# Patient Record
Sex: MALE | Race: WHITE | NOT HISPANIC OR LATINO | Employment: OTHER | URBAN - METROPOLITAN AREA
[De-identification: names, ages, dates, MRNs, and addresses within clinical notes are randomized per-mention and may not be internally consistent; named-entity substitution may affect disease eponyms.]

---

## 2020-11-02 RX ORDER — MELOXICAM 15 MG/1
15 TABLET ORAL DAILY
COMMUNITY
End: 2020-12-03

## 2020-11-02 RX ORDER — HYDROCODONE BITARTRATE AND ACETAMINOPHEN 7.5; 325 MG/1; MG/1
1 TABLET ORAL EVERY 6 HOURS PRN
COMMUNITY
End: 2022-06-24

## 2020-11-02 RX ORDER — DIPHENHYDRAMINE HCL 50 MG
50 CAPSULE ORAL
COMMUNITY
End: 2020-12-03

## 2020-11-02 RX ORDER — METOPROLOL SUCCINATE 25 MG/1
12.5 TABLET, EXTENDED RELEASE ORAL DAILY
COMMUNITY
Start: 2020-10-24 | End: 2020-12-03 | Stop reason: SDUPTHER

## 2020-11-03 ENCOUNTER — OFFICE VISIT (OUTPATIENT)
Dept: CARDIOLOGY | Facility: CLINIC | Age: 55
End: 2020-11-03

## 2020-11-03 VITALS
HEART RATE: 83 BPM | OXYGEN SATURATION: 99 % | WEIGHT: 274.6 LBS | SYSTOLIC BLOOD PRESSURE: 108 MMHG | DIASTOLIC BLOOD PRESSURE: 68 MMHG | HEIGHT: 73 IN | BODY MASS INDEX: 36.39 KG/M2

## 2020-11-03 DIAGNOSIS — I77.810 DILATED AORTIC ROOT (HCC): ICD-10-CM

## 2020-11-03 DIAGNOSIS — I51.9 RIGHT VENTRICULAR DYSFUNCTION: ICD-10-CM

## 2020-11-03 DIAGNOSIS — Z82.49 FAMILY HISTORY OF CORONARY ARTERY DISEASE: ICD-10-CM

## 2020-11-03 DIAGNOSIS — R07.2 PRECORDIAL PAIN: ICD-10-CM

## 2020-11-03 DIAGNOSIS — G47.30 SLEEP APNEA, UNSPECIFIED TYPE: ICD-10-CM

## 2020-11-03 DIAGNOSIS — I48.0 PAROXYSMAL ATRIAL FIBRILLATION (HCC): Primary | ICD-10-CM

## 2020-11-03 PROCEDURE — 99205 OFFICE O/P NEW HI 60 MIN: CPT | Performed by: INTERNAL MEDICINE

## 2020-11-04 NOTE — PROGRESS NOTES
Date of Office Visit: 2020  Encounter Provider: Carlos A Falcon MD  Place of Service: [facility] CARDIOLOGY  Patient Name: Aris Cameron  :1965    Chief complaint:  Paroxysmal atrial fibrillation, right ventricular dysfunction, dilated aortic root, family history of coronary artery disease, and recent chest pain.    History of Present Illness:    I had the pleasure of seeing the patient in cardiology office on 11/3/2020.  He is a very pleasant 55 year-old male with progressive deafness, paroxysmal atrial fibrillation, and a strong family history of coronary artery disease who presents for evaluation.  The patient is completely deaf, but communicates very effectively with reading lips and written cues.    The patient states that he developed chest discomfort in 2020 which he described as a tightness across his precordium with some radiation and paresthesia into the left arm.  This went on for approximately 1 week, and he also had been feeling his heart fluttering at times.  He presented to his primary care physician on 10/23/2020, and he was found to be in atrial fibrillation with rapid ventricular response.  This was a new diagnosis for him, and he subsequently was admitted to [facility].  He was rate controlled, and eventually converted back to sinus rhythm.  He was ultimately discharged on metoprolol succinate, and he was initiated on anticoagulation with Eliquis.  While hospitalized, he did have an echocardiogram on 10/23/2020 which showed a normal ejection fraction of 58%, although his right ventricle was noted to be enlarged and severely reduced in function.  He also had a mildly dilated aortic root.    Since discharge, he does feel better.  He has had very little chest discomfort, and he states he feels better in rhythm.  However, he still feels the atrial fibrillation intermittently which will last for 15 to 20 minutes at a time.  His  heart rate will typically go up, and he can feel the palpitations which she describes as a fluttering sensation.  The metoprolol has helped, although his blood pressure has been lower at times, and is 108/68 today.  He has not had any lightheadedness or near syncope.  He did tell me that he does snore at night, and he certainly could have sleep apnea, although he has never been checked.  He was drinking up to 24 cans of beer per week prior to being diagnosed, although he has not had any alcohol since the atrial fibrillation was diagnosed.    Of note, he does have a very significant family history of coronary artery disease.  His mother is  from a fatal MI at age 59, and his father had an MI at age 62 in the setting of prostate cancer.  His brother also had a fatal MI at age 57, although he had end-stage renal disease on dialysis as well.    Past Medical History:   Diagnosis Date   • Anxiety    • Deaf    • GERD (gastroesophageal reflux disease)    • HTN (hypertension)    • Insomnia    • Obesity    • PAF (paroxysmal atrial fibrillation) (CMS/[facility])        Past Surgical History:   Procedure Laterality Date   • HAND SURGERY     • INNER EAR SURGERY         Current Outpatient Medications on File Prior to Visit   Medication Sig Dispense Refill   • diphenhydrAMINE (BENADRYL) 50 MG capsule Take 50 mg by mouth.     • HYDROcodone-acetaminophen (NORCO) 7.5-325 MG per tablet Take 1 tablet by mouth.     • meloxicam (MOBIC) 15 MG tablet Take 15 mg by mouth Daily.     • metoprolol succinate XL (TOPROL-XL) 25 MG 24 hr tablet Take 12.5 mg by mouth Daily.       No current facility-administered medications on file prior to visit.      Allergies as of 2020   • (No Known Allergies)     Social History     Socioeconomic History   • Marital status: Single     Spouse name: Not on file   • Number of children: Not on file   • Years of education: Not on file   • Highest education level: Not on file   Tobacco Use   • Smoking status:  "Never Smoker   • Smokeless tobacco: Never Used   Substance and Sexual Activity   • Alcohol use: Yes     Alcohol/week: 24.0 standard drinks     Types: 24 Cans of beer per week     Binge frequency: Weekly   • Drug use: Never   • Sexual activity: Yes     Family History   Problem Relation Age of Onset   • Coronary artery disease Mother         Mother with fatal MI at age 59   • Coronary artery disease Father         Father with MI at age 62   • Prostate cancer Father 59        malignant tumor of prostate  62   • Heart disease Brother 57        Brother with fatal MI at age 57       Review of Systems   HENT: Positive for hearing loss.    Cardiovascular: Positive for chest pain and palpitations.   Genitourinary: Positive for frequency.   Neurological: Positive for numbness.   All other systems reviewed and are negative.     Objective:     Vitals:    20 1014   BP: 108/68   Pulse: 83   SpO2: 99%   Weight: 125 kg (274 lb 9.6 oz)   Height: 185.4 cm (73\")     Body mass index is 36.23 kg/m².    Constitutional:       Appearance: Healthy appearance. Well-developed.   Eyes:      Conjunctiva/sclera: Conjunctivae normal.   HENT:      Head: Normocephalic and atraumatic.         Comments: Deaf  Neck:      Musculoskeletal: Neck supple.   Pulmonary:      Effort: Pulmonary effort is normal.      Breath sounds: Normal breath sounds.   Cardiovascular:      Normal rate. Regular rhythm.      Murmurs: There is no murmur.      No gallop.   Edema:     Peripheral edema absent.   Abdominal:      Palpations: Abdomen is soft.      Tenderness: There is no abdominal tenderness.   Skin:     General: Skin is warm.   Neurological:      Mental Status: Alert and oriented to person, place, and time.   Psychiatric:         Behavior: Behavior normal.       Lab Review:   Procedures    Cardiac Procedures:  1.  Echocardiogram on 10/23/2020 [facility]: The ejection fraction was 58%.  There was mild LVH.  The right ventricle was enlarged and " severely reduced in function.  The left atrium was mildly enlarged.  The aortic root was mildly dilated.    Assessment:       Diagnosis Plan   1. Paroxysmal atrial fibrillation (CMS/HCC)  Home Sleep Study    CT Angiogram Chest With & Without Contrast   2. Precordial pain  CT Angiogram Chest With & Without Contrast   3. Right ventricular dysfunction  CT Angiogram Chest With & Without Contrast   4. Dilated aortic root (CMS/HCC)  CT Angiogram Chest With & Without Contrast   5. Family history of coronary artery disease     6. Sleep apnea, unspecified type  Home Sleep Study     Plan:       I had a long discussion with the patient, again with lipreading and I wrote down several things for him as well.  On reviewing his echocardiogram, he had significant right ventricular dysfunction in the setting of chest discomfort and newly diagnosed atrial fibrillation.  I would be most concerned about a pulmonary embolism in this setting, and I certainly feel this needs to be evaluated even with him on anticoagulation.  I am going to check a CT angiogram of his chest to evaluate for any potential pulmonary embolism at this time.  His aortic root was also dilated on the echocardiogram, and the CT angiogram will also further evaluate his aorta in detail.    I agree with the Eliquis for anticoagulation.  He is not having any issues with taking this.  I did educate him on alcohol and the correlation with atrial fibrillation.  I told him to limit drinking to less than 2 standard drinks, and preferably to abstain if possible.  This will minimize the atrial fibrillation more, and also will minimize his risk of bleeding as alcohol is also a blood thinner.  We also discussed his sleep habits in detail, and he did agree to a sleep study.  He states that he certainly could have sleep apnea, which could be contributing to the atrial fibrillation as well.  I have ordered a home sleep study, which she agreed with.    His blood pressure has been on  the lower side, and I am going to have him take the metoprolol succinate at 12.5 mg/day.  If he has episodes of atrial fibrillation that are significant, he can take either an extra 12.5 or 25 mg.  His blood pressure today is 108/68, and I did not want to run much lower than this to cause any lightheadedness.    He does have a very significant family history of coronary artery disease as detailed above in both of his parents and his brother.  If his CT angiogram of the chest does not show significant evidence of a pulmonary embolism or other aortic pathology, I will likely proceed with a nuclear stress test afterwards.  I do feel that an ischemic evaluation is warranted in the setting with his family history and his chest discomfort and left arm paresthesias prior to his diagnosis of atrial fibrillation.    I will have him follow-up with [facility], in 1 month.  He will follow-up with me in 3 months.    I spent 1 hour in the care of the patient, including extensively reviewing his hospital records, as well as in direct patient care.  Greater than 50% of this time was spent in face-to-face counseling with the patient regarding his management of the atrial fibrillation and work-up of his chest discomfort and right ventricular dysfunction.

## 2020-11-11 ENCOUNTER — HOSPITAL ENCOUNTER (OUTPATIENT)
Dept: CT IMAGING | Facility: HOSPITAL | Age: 55
Discharge: HOME | End: 2020-11-11
Admitting: INTERNAL MEDICINE

## 2020-11-11 LAB — CREAT BLDA-MCNC: 0.9 MG/DL (ref 0.6–1.3)

## 2020-11-11 PROCEDURE — 0 IOPAMIDOL PER 1 ML: Performed by: INTERNAL MEDICINE

## 2020-11-11 PROCEDURE — 71275 CT ANGIOGRAPHY CHEST: CPT

## 2020-11-11 PROCEDURE — 82565 ASSAY OF CREATININE: CPT

## 2020-11-11 RX ADMIN — IOPAMIDOL 95 ML: 755 INJECTION, SOLUTION INTRAVENOUS at 13:51

## 2020-11-12 DIAGNOSIS — I48.0 PAF (PAROXYSMAL ATRIAL FIBRILLATION) (HCC): Primary | ICD-10-CM

## 2020-11-12 NOTE — PROGRESS NOTES
I called and gave the results.  He will need a repeat CT scan in 1 year.  He has been having rapid heart rates frequently and feels his heart going into the 150s at times.  I am going to get a 24-hour Holter monitor as soon as possible to reassess the atrial fibrillation.    GM

## 2020-11-13 ENCOUNTER — APPOINTMENT (OUTPATIENT)
Dept: CT IMAGING | Facility: HOSPITAL | Age: 55
End: 2020-11-13

## 2020-11-30 ENCOUNTER — TELEPHONE (OUTPATIENT)
Dept: CARDIOLOGY | Facility: CLINIC | Age: 55
End: 2020-11-30

## 2020-11-30 NOTE — TELEPHONE ENCOUNTER
I called an spoke with patient's wife Darcy to inform her that [facility] has switched to telehealth due to the rise in covid numbers. She stated that the patient is deaf so he cannot have telehealth visits. I scheduled them to be seen in person with ADOLPH John.

## 2020-12-03 ENCOUNTER — OFFICE VISIT (OUTPATIENT)
Dept: CARDIOLOGY | Facility: CLINIC | Age: 55
End: 2020-12-03

## 2020-12-03 DIAGNOSIS — I77.810 ASCENDING AORTA DILATION (HCC): ICD-10-CM

## 2020-12-03 DIAGNOSIS — H91.90 DEAFNESS, UNSPECIFIED LATERALITY: ICD-10-CM

## 2020-12-03 DIAGNOSIS — R07.9 CHEST PAIN, UNSPECIFIED TYPE: ICD-10-CM

## 2020-12-03 DIAGNOSIS — I48.91 ATRIAL FIBRILLATION WITH RVR (HCC): Primary | ICD-10-CM

## 2020-12-03 DIAGNOSIS — R79.89 ELEVATED LFTS: ICD-10-CM

## 2020-12-03 DIAGNOSIS — F10.10 ALCOHOL ABUSE, DAILY USE: ICD-10-CM

## 2020-12-03 DIAGNOSIS — R29.818 SUSPECTED SLEEP APNEA: ICD-10-CM

## 2020-12-03 DIAGNOSIS — E66.9 CLASS 2 OBESITY WITH BODY MASS INDEX (BMI) OF 37.0 TO 37.9 IN ADULT, UNSPECIFIED OBESITY TYPE, UNSPECIFIED WHETHER SERIOUS COMORBIDITY PRESENT: ICD-10-CM

## 2020-12-03 DIAGNOSIS — I10 ESSENTIAL HYPERTENSION: ICD-10-CM

## 2020-12-03 DIAGNOSIS — E78.5 HYPERLIPIDEMIA, UNSPECIFIED HYPERLIPIDEMIA TYPE: ICD-10-CM

## 2020-12-03 DIAGNOSIS — I77.810 AORTIC ROOT DILATION (HCC): ICD-10-CM

## 2020-12-03 PROCEDURE — 93000 ELECTROCARDIOGRAM COMPLETE: CPT | Performed by: NURSE PRACTITIONER

## 2020-12-03 PROCEDURE — 99215 OFFICE O/P EST HI 40 MIN: CPT | Performed by: NURSE PRACTITIONER

## 2020-12-03 RX ORDER — ZOLPIDEM TARTRATE 12.5 MG/1
12.5 TABLET, FILM COATED, EXTENDED RELEASE ORAL
COMMUNITY
Start: 2020-10-26

## 2020-12-03 RX ORDER — AMIODARONE HYDROCHLORIDE 200 MG/1
200 TABLET ORAL DAILY
Qty: 30 TABLET | Refills: 0 | Status: SHIPPED | OUTPATIENT
Start: 2020-12-03 | End: 2020-12-18

## 2020-12-03 RX ORDER — METOPROLOL SUCCINATE 25 MG/1
12.5 TABLET, EXTENDED RELEASE ORAL DAILY
Qty: 15 TABLET | Refills: 1 | Status: SHIPPED | OUTPATIENT
Start: 2020-12-03 | End: 2020-12-28

## 2020-12-03 NOTE — H&P (VIEW-ONLY)
Date of Office Visit: 2020  Encounter Provider: NGOC Tabares  Primary Cardiologist: Dr. Falcon  Place of Service: [facility] CARDIOLOGY  Patient Name: Aris Cameron  :1965      Subjective:     Chief Complaint:  PAF, chest pain 1 month follow up    History of Present Illness:  Aris Cameron is a pleasant 55 y.o. male who is new to me .  Outside records have been requested and reviewed by me if available.     This is a patient of Dr. Falcon with progressive deafness (does not sign but communicates with written cues and reading lips), paroxysmal atrial fibrillation, strong family history of coronary artery disease, dilated aortic root, RV dysfunction, suspected sleep apnea, daily alcohol use.    Patient developed chest tightness across the precordium with radiation paresthesia into the left arm that went on for approximately 1 week with some fluttering of his heart and saw his PCP 10/23/2020 was found to be in atrial fibrillation with rapid ventricular response which is a new diagnosis so he was admitted to [facility].  He was rate controlled and eventually converted back to sinus rhythm and was discharged on metoprolol succinate and initiated on anticoagulation with Eliquis.  He had an echo 10/23/2020 that showed normal EF 58%, right ventricle was noted to be enlarged and severely reduced in function and mildly dilated aortic root.    11/3/2020 patient presented to see Dr. Falcon.  Patient was still having some atrial fibrillation intermittently lasting 15 to 20 minutes at a time can feel palpitations.  Apparently had very little chest discomfort.  Metoprolol had helped but his blood pressure been lower at times and was 108/68 but he had not had any lightheadedness or near syncope.  He did report snoring.  Was noted he was drinking up to 24 cans of beer per week but had not had any alcohol since atrial fibrillation was diagnosed.   Mother  of a fatal MI at age 59, father had MI at age 62 in setting of prostate cancer, brother with fatal MI at age 57 but had end-stage renal disease on dialysis as well.  Patient was set up for CT angiogram of the chest to rule out PE and to his severe RV dysfunction as well as evaluate his aortic root.  Metoprolol succinate was continued at 12.5 mg/day and he could take an extra 12.5 or 25 mg for A. fib episodes.  Is to do a nuclear stress test if he did not have any PE or significant aortic pathology due to his chest pain symptoms and strong family history of CAD/risk factors.    2020 CT of the chest report pulmonary embolism aortic root was dilated measuring 4.4 cm and ascending aorta was mildly dilated at 4.1 cm    Due to some confusion with appointments patient ended up seeing NGOC Jensen at [facility]. fib clinic 2020 .It was recommended to complete 30 days of Eliquis then stop and start aspirin 81 mg daily and to continue metoprolol succinate.  I also recommended sleep study and limit alcohol take to 2 beers per day.    At today's visit patient is presenting for 1 month PAF follow-up.  Communication was done with use of iPad for lipreading purposes due to necessity of mask wearing.  Patient reports daily episodes sometimes multiple times a day of rapid heartbeat up into the 190s at times lasting around 15 minutes.  At times he can go a day or 2 without symptoms.  He typically symptomatic when his heart rate is above 120s.  He typically develops some palpitations, chest tightness that can be associated with some left arm paresthesias as well as some shortness of breath with it.  At other times it sounds like he may have some exertional chest tightness and possible shortness of breath that is sudden onset and resolves quickly with rest.  He does have trouble sleeping but is difficult to tell if he is describing nocturnal dyspnea or not.  He ran out of his Toprol-XL yesterday but he been  taking it daily prior to that.  He has also not stopped his apixaban as previously recommended by me.  Denies any bleeding issues or falls.  He does have some episodes of vertigo that are sudden onset and the room is spinning.  Outside of that he does have some mild lightheadedness with A. fib episodes.  Blood pressure is typically low with these episodes 90s/70s or low 100s.  He has had a couple of isolated blood pressure readings 130s to 140s/80s-90s but typically the readings he has showed me his blood pressures on the lower end.  He did end up going into rapid atrial fibrillation in the office but was actually asymptomatic with this.  He had quit alcohol for a while but is back to drinking 2 beers a day which he states has been his recommended limit.  He does have some neck pain issues and due to needing narcotic pain medicine was taken off his medicine for his anxiety sometimes he thinks anxiety drives his symptoms  Past Medical History:   Diagnosis Date   • Anxiety    • Deaf    • GERD (gastroesophageal reflux disease)    • HTN (hypertension)    • Insomnia    • Obesity    • PAF (paroxysmal atrial fibrillation) (CMS/[facility])      Past Surgical History:   Procedure Laterality Date   • HAND SURGERY     • INNER EAR SURGERY       Outpatient Medications Prior to Visit   Medication Sig Dispense Refill   • HYDROcodone-acetaminophen (NORCO) 7.5-325 MG per tablet Take 1 tablet by mouth.     • zolpidem CR (AMBIEN CR) 12.5 MG CR tablet Take 12.5 mg by mouth every night at bedtime.     • apixaban (ELIQUIS) 5 MG tablet tablet Take 1 tablet by mouth 2 (Two) Times a Day. 60 tablet 6   • diphenhydrAMINE (BENADRYL) 50 MG capsule Take 50 mg by mouth.     • meloxicam (MOBIC) 15 MG tablet Take 15 mg by mouth Daily.     • metoprolol succinate XL (TOPROL-XL) 25 MG 24 hr tablet Take 12.5 mg by mouth Daily.       No facility-administered medications prior to visit.        Allergies as of 12/03/2020   • (No Known Allergies)     Social  History     Socioeconomic History   • Marital status: Single     Spouse name: Not on file   • Number of children: Not on file   • Years of education: Not on file   • Highest education level: Not on file   Tobacco Use   • Smoking status: Never Smoker   • Smokeless tobacco: Never Used   • Tobacco comment: No caffeine   Substance and Sexual Activity   • Alcohol use: Yes     Alcohol/week: 24.0 standard drinks     Types: 24 Cans of beer per week     Binge frequency: Weekly   • Drug use: Never   • Sexual activity: Yes     Family History   Problem Relation Age of Onset   • Coronary artery disease Mother         Mother with fatal MI at age 59   • Coronary artery disease Father         Father with MI at age 62   • Prostate cancer Father 59        malignant tumor of prostate  62   • Heart disease Brother 57        Brother with fatal MI at age 57     Review of Systems   Constitution: Negative for chills, fever and malaise/fatigue.   HENT: Negative for ear pain, hearing loss, nosebleeds and sore throat.    Eyes: Negative for double vision, pain, vision loss in left eye and vision loss in right eye.   Cardiovascular: Positive for chest pain, dyspnea on exertion and palpitations. Negative for claudication, irregular heartbeat, leg swelling, near-syncope, orthopnea, paroxysmal nocturnal dyspnea and syncope.   Respiratory: Positive for shortness of breath. Negative for cough, snoring and wheezing.    Endocrine: Negative for cold intolerance and heat intolerance.   Hematologic/Lymphatic: Negative for bleeding problem.   Skin: Positive for itching and rash. Negative for color change and unusual hair distribution.   Musculoskeletal: Negative for joint pain and joint swelling.   Gastrointestinal: Negative for abdominal pain, diarrhea, hematochezia, melena, nausea and vomiting.   Genitourinary: Negative for decreased libido, frequency, hematuria, hesitancy and incomplete emptying.   Neurological: Positive for light-headedness,  "numbness, paresthesias and vertigo. Negative for excessive daytime sleepiness, dizziness, headaches, loss of balance and seizures.   Psychiatric/Behavioral: Negative for depression.          Objective:     Vitals:    12/03/20 1344 12/03/20 1421   BP: 130/80    BP Location: Right arm    Patient Position: Sitting    Pulse: 81 (!) 156   SpO2:  98%   Weight: 128 kg (282 lb)    Height: 185.4 cm (73\")      Body mass index is 37.21 kg/m².    PHYSICAL EXAM:  Vitals signs and nursing note reviewed.   Constitutional:       General: Not in acute distress.     Appearance: Well-developed and not in distress. Not diaphoretic.   HENT:      Head: Normocephalic and atraumatic.         Comments: Deaf  Neck:      Vascular: No carotid bruit.      Comments: Difficult to assess JVD due to body habitus  Pulmonary:      Effort: Pulmonary effort is normal. No respiratory distress.      Breath sounds: Normal breath sounds. No decreased breath sounds. No wheezing. No rhonchi.   Cardiovascular:      Tachycardia present. Irregularly irregular rhythm.      Murmurs: There is no murmur.      Comments: No pitting lower extremity edema underneath boots appreciated  Initially was in a regular rhythm with occasional ectopic beats but on examRapid irregular heartbeat  Pulses:     Radial: 2+ bilaterally.  Abdominal:      General: Bowel sounds are normal. There is no distension.      Palpations: Abdomen is soft.      Tenderness: There is no abdominal tenderness.   Skin:     General: Skin is warm and dry.      Findings: No erythema.   Neurological:      Mental Status: Alert and oriented to person, place, and time.   Psychiatric:         Attention and Perception: Attention normal.         Mood and Affect: Mood normal.         Speech: Speech normal.         Behavior: Behavior normal.         Thought Content: Thought content normal.         Judgment: Judgment normal.           ECG 12 Lead    Date/Time: 12/3/2020 2:24 PM  Performed by: Mary John" NGOC  Authorized by: Mary John APRN   Comparison: compared with previous ECG from 10/22/2020  Comparison to previous EC/3/2020 1354 EKG showed sinus arrhythmia otherwise not significantly changed from [facility] EKG  Rhythm: atrial fibrillation  Rate: tachycardic  BPM: 146  QRS axis: normal  Other findings: non-specific ST-T wave changes and T wave abnormality    Clinical impression: abnormal EKG  Comments: Rapid atrial fibrillation with some nonspecific T wave abnormalities primarily inferior leads  Indication: PAF, family history of CAD, intermittent chest pain            Most recent lab review:    Assessment:       Diagnosis Plan   1. Atrial fibrillation with RVR (CMS/HCC)  Stress Test With Pet Myocardial Perfusion (MULTI STUDY, REST AND STRESS)    ECG 12 Lead   2. Deafness, unspecified laterality  Stress Test With Pet Myocardial Perfusion (MULTI STUDY, REST AND STRESS)    ECG 12 Lead   3. Essential hypertension  Stress Test With Pet Myocardial Perfusion (MULTI STUDY, REST AND STRESS)    ECG 12 Lead   4. Class 2 obesity with body mass index (BMI) of 37.0 to 37.9 in adult, unspecified obesity type, unspecified whether serious comorbidity present  Stress Test With Pet Myocardial Perfusion (MULTI STUDY, REST AND STRESS)    ECG 12 Lead   5. Chest pain, unspecified type  Stress Test With Pet Myocardial Perfusion (MULTI STUDY, REST AND STRESS)    ECG 12 Lead   6. Aortic root dilation (CMS/HCC)     7. Ascending aorta dilation (CMS/HCC)     8. Hyperlipidemia, unspecified hyperlipidemia type     9. Alcohol abuse, daily use     10. Suspected sleep apnea     11. Elevated LFTs         Plan:     1.  Paroxysmal atrial fibrillation: Oct. 2020 first diagnosed at [facility] symptomatic with chest tightness and rapid heartbeat.  Troponins were negative, TSH normal 1.500 magnesium normal phosphorus normal BMP with normal potassium and electrolytes  2.  Daily alcohol use: States he was drinking 16-18 beers quit for a month and  then started back drinking 2 beers per day which has had no change on his symptoms.  3.  Deafness: Darcy is his caretaker she was updated over the phone via plan of care patient was also given written instruction.  He does not use sign language she uses written cues and reads lips.  4.  Obesity : Would benefit from weight loss as this is a large health risk for the patient  5.  Suspected sleep apnea: Being set up for home sleep study scheduled 12/8/2020  6.  Dilated aortic root measuring 4.4 cm with a dilated ascending aorta 4.1 cm.  Plan for repeat CT scan in 1 year.  7.  Hyperlipidemia: Treatment discussion to be had at next appt.   Diet and exercise reviewed  8. elevated LFTs: Need to cut back on ETOH use and follow up with PCP  9.  Chest pain: Proceed with nuclear stress PET.  10.  Strong family history of CAD    I discussed the case with Dr. Falcon.  He went into rapid atrial fibrillation in the office but was asymptomatic.  We will continue metoprolol succinate 12.5 mg daily due to relatively low blood pressures and initiate amiodarone 200 mg daily with plans for short-term management of heart rhythm and rate.  If his stress PET is negative for ischemia we would like to try possibly flecainide for rhythm control.  He will continue apixaban 5 mg twice daily.  I did discuss with him the importance of his cutting back on alcohol use and preferably refraining but no more than 2 beers per day.  At this point in time I recommend he hold off on starting physical therapy at [facility] for his neck pain until we get his atrial fibrillation under control and determined that his stress test is negative.    We will try to obtain the results of his Zio patch that was just performed for 7 days through [facility].  We will cancel his upcoming Holter.  Further recommendations will be made following his stress PET results.  I did discuss this at length with his caretaker Darcy and patient was also given written instructions of the  plan of care.  I did review possible side effects of the amiodarone and recommended he take with food and call with any intolerances.  They will call with worsening symptoms in the interim.    Follow up with Dr. Falcon on 2/4/2021 as scheduled unless otherwise needed sooner.  I advised the patient to contact our office with any questions or concerns.       It has been a pleasure to participate in this patient's care. Please feel free to contact me with any questions or concerns.     NGOC Tabares  12/04/2020             Your medication list          Accurate as of December 3, 2020 11:59 PM. If you have any questions, ask your nurse or doctor.            START taking these medications      Instructions Last Dose Given Next Dose Due   amiodarone 200 MG tablet  Commonly known as: PACERONE  Started by: NGOC Tabares      Take 1 tablet by mouth Daily. Temporary supply          CONTINUE taking these medications      Instructions Last Dose Given Next Dose Due   apixaban 5 MG tablet tablet  Commonly known as: ELIQUIS      Take 1 tablet by mouth 2 (Two) Times a Day.       HYDROcodone-acetaminophen 7.5-325 MG per tablet  Commonly known as: NORCO      Take 1 tablet by mouth.       metoprolol succinate XL 25 MG 24 hr tablet  Commonly known as: TOPROL-XL      Take 0.5 tablets by mouth Daily for 30 days.       zolpidem CR 12.5 MG CR tablet  Commonly known as: AMBIEN CR      Take 12.5 mg by mouth every night at bedtime.          STOP taking these medications    diphenhydrAMINE 50 MG capsule  Commonly known as: BENADRYL  Stopped by: NGOC Tabares        meloxicam 15 MG tablet  Commonly known as: MOBIC  Stopped by: NGOC Tabares              Where to Get Your Medications      These medications were sent to [facility]/pharmacy #[id number] - [place], [place] - [address] [place] - [phone] [facility] [phone]   [address], [place] [address]    Hours: 24-hours Phone: [phone]    · amiodarone 200 MG tablet  · apixaban 5 MG tablet tablet  · metoprolol succinate XL 25 MG 24 hr tablet         The above medication changes may not have been made by this provider.  Medication list was updated to reflect medications patient is currently taking including medication changes and discontinuations made by other healthcare providers or the patients themselves.     Dictated utilizing Dragon Dictation System.

## 2020-12-03 NOTE — PROGRESS NOTES
Date of Office Visit: 2020  Encounter Provider: NGOC Tabares  Primary Cardiologist: Dr. Falcon  Place of Service: [facility] CARDIOLOGY  Patient Name: Aris Cameron  :1965      Subjective:     Chief Complaint:  PAF, chest pain 1 month follow up    History of Present Illness:  Aris Cameron is a pleasant 55 y.o. male who is new to me .  Outside records have been requested and reviewed by me if available.     This is a patient of Dr. Falcon with progressive deafness (does not sign but communicates with written cues and reading lips), paroxysmal atrial fibrillation, strong family history of coronary artery disease, dilated aortic root, RV dysfunction, suspected sleep apnea, daily alcohol use.    Patient developed chest tightness across the precordium with radiation paresthesia into the left arm that went on for approximately 1 week with some fluttering of his heart and saw his PCP 10/23/2020 was found to be in atrial fibrillation with rapid ventricular response which is a new diagnosis so he was admitted to [facility].  He was rate controlled and eventually converted back to sinus rhythm and was discharged on metoprolol succinate and initiated on anticoagulation with Eliquis.  He had an echo 10/23/2020 that showed normal EF 58%, right ventricle was noted to be enlarged and severely reduced in function and mildly dilated aortic root.    11/3/2020 patient presented to see Dr. Falcon.  Patient was still having some atrial fibrillation intermittently lasting 15 to 20 minutes at a time can feel palpitations.  Apparently had very little chest discomfort.  Metoprolol had helped but his blood pressure been lower at times and was 108/68 but he had not had any lightheadedness or near syncope.  He did report snoring.  Was noted he was drinking up to 24 cans of beer per week but had not had any alcohol since atrial fibrillation was diagnosed.   Mother  of a fatal MI at age 59, father had MI at age 62 in setting of prostate cancer, brother with fatal MI at age 57 but had end-stage renal disease on dialysis as well.  Patient was set up for CT angiogram of the chest to rule out PE and to his severe RV dysfunction as well as evaluate his aortic root.  Metoprolol succinate was continued at 12.5 mg/day and he could take an extra 12.5 or 25 mg for A. fib episodes.  Is to do a nuclear stress test if he did not have any PE or significant aortic pathology due to his chest pain symptoms and strong family history of CAD/risk factors.    2020 CT of the chest report pulmonary embolism aortic root was dilated measuring 4.4 cm and ascending aorta was mildly dilated at 4.1 cm    Due to some confusion with appointments patient ended up seeing NGOC Jensen at [facility]. fib clinic 2020 .It was recommended to complete 30 days of Eliquis then stop and start aspirin 81 mg daily and to continue metoprolol succinate.  I also recommended sleep study and limit alcohol take to 2 beers per day.    At today's visit patient is presenting for 1 month PAF follow-up.  Communication was done with use of iPad for lipreading purposes due to necessity of mask wearing.  Patient reports daily episodes sometimes multiple times a day of rapid heartbeat up into the 190s at times lasting around 15 minutes.  At times he can go a day or 2 without symptoms.  He typically symptomatic when his heart rate is above 120s.  He typically develops some palpitations, chest tightness that can be associated with some left arm paresthesias as well as some shortness of breath with it.  At other times it sounds like he may have some exertional chest tightness and possible shortness of breath that is sudden onset and resolves quickly with rest.  He does have trouble sleeping but is difficult to tell if he is describing nocturnal dyspnea or not.  He ran out of his Toprol-XL yesterday but he been  taking it daily prior to that.  He has also not stopped his apixaban as previously recommended by me.  Denies any bleeding issues or falls.  He does have some episodes of vertigo that are sudden onset and the room is spinning.  Outside of that he does have some mild lightheadedness with A. fib episodes.  Blood pressure is typically low with these episodes 90s/70s or low 100s.  He has had a couple of isolated blood pressure readings 130s to 140s/80s-90s but typically the readings he has showed me his blood pressures on the lower end.  He did end up going into rapid atrial fibrillation in the office but was actually asymptomatic with this.  He had quit alcohol for a while but is back to drinking 2 beers a day which he states has been his recommended limit.  He does have some neck pain issues and due to needing narcotic pain medicine was taken off his medicine for his anxiety sometimes he thinks anxiety drives his symptoms  Past Medical History:   Diagnosis Date   • Anxiety    • Deaf    • GERD (gastroesophageal reflux disease)    • HTN (hypertension)    • Insomnia    • Obesity    • PAF (paroxysmal atrial fibrillation) (CMS/[facility])      Past Surgical History:   Procedure Laterality Date   • HAND SURGERY     • INNER EAR SURGERY       Outpatient Medications Prior to Visit   Medication Sig Dispense Refill   • HYDROcodone-acetaminophen (NORCO) 7.5-325 MG per tablet Take 1 tablet by mouth.     • zolpidem CR (AMBIEN CR) 12.5 MG CR tablet Take 12.5 mg by mouth every night at bedtime.     • apixaban (ELIQUIS) 5 MG tablet tablet Take 1 tablet by mouth 2 (Two) Times a Day. 60 tablet 6   • diphenhydrAMINE (BENADRYL) 50 MG capsule Take 50 mg by mouth.     • meloxicam (MOBIC) 15 MG tablet Take 15 mg by mouth Daily.     • metoprolol succinate XL (TOPROL-XL) 25 MG 24 hr tablet Take 12.5 mg by mouth Daily.       No facility-administered medications prior to visit.        Allergies as of 12/03/2020   • (No Known Allergies)     Social  History     Socioeconomic History   • Marital status: Single     Spouse name: Not on file   • Number of children: Not on file   • Years of education: Not on file   • Highest education level: Not on file   Tobacco Use   • Smoking status: Never Smoker   • Smokeless tobacco: Never Used   • Tobacco comment: No caffeine   Substance and Sexual Activity   • Alcohol use: Yes     Alcohol/week: 24.0 standard drinks     Types: 24 Cans of beer per week     Binge frequency: Weekly   • Drug use: Never   • Sexual activity: Yes     Family History   Problem Relation Age of Onset   • Coronary artery disease Mother         Mother with fatal MI at age 59   • Coronary artery disease Father         Father with MI at age 62   • Prostate cancer Father 59        malignant tumor of prostate  62   • Heart disease Brother 57        Brother with fatal MI at age 57     Review of Systems   Constitution: Negative for chills, fever and malaise/fatigue.   HENT: Negative for ear pain, hearing loss, nosebleeds and sore throat.    Eyes: Negative for double vision, pain, vision loss in left eye and vision loss in right eye.   Cardiovascular: Positive for chest pain, dyspnea on exertion and palpitations. Negative for claudication, irregular heartbeat, leg swelling, near-syncope, orthopnea, paroxysmal nocturnal dyspnea and syncope.   Respiratory: Positive for shortness of breath. Negative for cough, snoring and wheezing.    Endocrine: Negative for cold intolerance and heat intolerance.   Hematologic/Lymphatic: Negative for bleeding problem.   Skin: Positive for itching and rash. Negative for color change and unusual hair distribution.   Musculoskeletal: Negative for joint pain and joint swelling.   Gastrointestinal: Negative for abdominal pain, diarrhea, hematochezia, melena, nausea and vomiting.   Genitourinary: Negative for decreased libido, frequency, hematuria, hesitancy and incomplete emptying.   Neurological: Positive for light-headedness,  "numbness, paresthesias and vertigo. Negative for excessive daytime sleepiness, dizziness, headaches, loss of balance and seizures.   Psychiatric/Behavioral: Negative for depression.          Objective:     Vitals:    12/03/20 1344 12/03/20 1421   BP: 130/80    BP Location: Right arm    Patient Position: Sitting    Pulse: 81 (!) 156   SpO2:  98%   Weight: 128 kg (282 lb)    Height: 185.4 cm (73\")      Body mass index is 37.21 kg/m².    PHYSICAL EXAM:  Vitals signs and nursing note reviewed.   Constitutional:       General: Not in acute distress.     Appearance: Well-developed and not in distress. Not diaphoretic.   HENT:      Head: Normocephalic and atraumatic.         Comments: Deaf  Neck:      Vascular: No carotid bruit.      Comments: Difficult to assess JVD due to body habitus  Pulmonary:      Effort: Pulmonary effort is normal. No respiratory distress.      Breath sounds: Normal breath sounds. No decreased breath sounds. No wheezing. No rhonchi.   Cardiovascular:      Tachycardia present. Irregularly irregular rhythm.      Murmurs: There is no murmur.      Comments: No pitting lower extremity edema underneath boots appreciated  Initially was in a regular rhythm with occasional ectopic beats but on examRapid irregular heartbeat  Pulses:     Radial: 2+ bilaterally.  Abdominal:      General: Bowel sounds are normal. There is no distension.      Palpations: Abdomen is soft.      Tenderness: There is no abdominal tenderness.   Skin:     General: Skin is warm and dry.      Findings: No erythema.   Neurological:      Mental Status: Alert and oriented to person, place, and time.   Psychiatric:         Attention and Perception: Attention normal.         Mood and Affect: Mood normal.         Speech: Speech normal.         Behavior: Behavior normal.         Thought Content: Thought content normal.         Judgment: Judgment normal.           ECG 12 Lead    Date/Time: 12/3/2020 2:24 PM  Performed by: Mary John" NGOC  Authorized by: Mray John APRN   Comparison: compared with previous ECG from 10/22/2020  Comparison to previous EC/3/2020 1354 EKG showed sinus arrhythmia otherwise not significantly changed from [facility] EKG  Rhythm: atrial fibrillation  Rate: tachycardic  BPM: 146  QRS axis: normal  Other findings: non-specific ST-T wave changes and T wave abnormality    Clinical impression: abnormal EKG  Comments: Rapid atrial fibrillation with some nonspecific T wave abnormalities primarily inferior leads  Indication: PAF, family history of CAD, intermittent chest pain            Most recent lab review:    Assessment:       Diagnosis Plan   1. Atrial fibrillation with RVR (CMS/HCC)  Stress Test With Pet Myocardial Perfusion (MULTI STUDY, REST AND STRESS)    ECG 12 Lead   2. Deafness, unspecified laterality  Stress Test With Pet Myocardial Perfusion (MULTI STUDY, REST AND STRESS)    ECG 12 Lead   3. Essential hypertension  Stress Test With Pet Myocardial Perfusion (MULTI STUDY, REST AND STRESS)    ECG 12 Lead   4. Class 2 obesity with body mass index (BMI) of 37.0 to 37.9 in adult, unspecified obesity type, unspecified whether serious comorbidity present  Stress Test With Pet Myocardial Perfusion (MULTI STUDY, REST AND STRESS)    ECG 12 Lead   5. Chest pain, unspecified type  Stress Test With Pet Myocardial Perfusion (MULTI STUDY, REST AND STRESS)    ECG 12 Lead   6. Aortic root dilation (CMS/HCC)     7. Ascending aorta dilation (CMS/HCC)     8. Hyperlipidemia, unspecified hyperlipidemia type     9. Alcohol abuse, daily use     10. Suspected sleep apnea     11. Elevated LFTs         Plan:     1.  Paroxysmal atrial fibrillation: Oct. 2020 first diagnosed at [facility] symptomatic with chest tightness and rapid heartbeat.  Troponins were negative, TSH normal 1.500 magnesium normal phosphorus normal BMP with normal potassium and electrolytes  2.  Daily alcohol use: States he was drinking 16-18 beers quit for a month and  then started back drinking 2 beers per day which has had no change on his symptoms.  3.  Deafness: Darcy is his caretaker she was updated over the phone via plan of care patient was also given written instruction.  He does not use sign language she uses written cues and reads lips.  4.  Obesity : Would benefit from weight loss as this is a large health risk for the patient  5.  Suspected sleep apnea: Being set up for home sleep study scheduled 12/8/2020  6.  Dilated aortic root measuring 4.4 cm with a dilated ascending aorta 4.1 cm.  Plan for repeat CT scan in 1 year.  7.  Hyperlipidemia: Treatment discussion to be had at next appt.   Diet and exercise reviewed  8. elevated LFTs: Need to cut back on ETOH use and follow up with PCP  9.  Chest pain: Proceed with nuclear stress PET.  10.  Strong family history of CAD    I discussed the case with Dr. Falcon.  He went into rapid atrial fibrillation in the office but was asymptomatic.  We will continue metoprolol succinate 12.5 mg daily due to relatively low blood pressures and initiate amiodarone 200 mg daily with plans for short-term management of heart rhythm and rate.  If his stress PET is negative for ischemia we would like to try possibly flecainide for rhythm control.  He will continue apixaban 5 mg twice daily.  I did discuss with him the importance of his cutting back on alcohol use and preferably refraining but no more than 2 beers per day.  At this point in time I recommend he hold off on starting physical therapy at [facility] for his neck pain until we get his atrial fibrillation under control and determined that his stress test is negative.    We will try to obtain the results of his Zio patch that was just performed for 7 days through [place].  We will cancel his upcoming Holter.  Further recommendations will be made following his stress PET results.  I did discuss this at length with his caretaker Darcy and patient was also given written instructions of the  plan of care.  I did review possible side effects of the amiodarone and recommended he take with food and call with any intolerances.  They will call with worsening symptoms in the interim.    Follow up with Dr. Falcon on 2/4/2021 as scheduled unless otherwise needed sooner.  I advised the patient to contact our office with any questions or concerns.       It has been a pleasure to participate in this patient's care. Please feel free to contact me with any questions or concerns.     NGOC Tabares  12/04/2020             Your medication list          Accurate as of December 3, 2020 11:59 PM. If you have any questions, ask your nurse or doctor.            START taking these medications      Instructions Last Dose Given Next Dose Due   amiodarone 200 MG tablet  Commonly known as: PACERONE  Started by: NGOC Tabares      Take 1 tablet by mouth Daily. Temporary supply          CONTINUE taking these medications      Instructions Last Dose Given Next Dose Due   apixaban 5 MG tablet tablet  Commonly known as: ELIQUIS      Take 1 tablet by mouth 2 (Two) Times a Day.       HYDROcodone-acetaminophen 7.5-325 MG per tablet  Commonly known as: NORCO      Take 1 tablet by mouth.       metoprolol succinate XL 25 MG 24 hr tablet  Commonly known as: TOPROL-XL      Take 0.5 tablets by mouth Daily for 30 days.       zolpidem CR 12.5 MG CR tablet  Commonly known as: AMBIEN CR      Take 12.5 mg by mouth every night at bedtime.          STOP taking these medications    diphenhydrAMINE 50 MG capsule  Commonly known as: BENADRYL  Stopped by: NGOC Tabares        meloxicam 15 MG tablet  Commonly known as: MOBIC  Stopped by: NGOC Tabares              Where to Get Your Medications      These medications were sent to [facility]/pharmacy #[id number] - [place], [place] - [address] IN THE [place] - [phone] [facility] [phone]   [address], [place] [address]    Hours: 24-hours Phone: [phone]    · amiodarone 200 MG tablet  · apixaban 5 MG tablet tablet  · metoprolol succinate XL 25 MG 24 hr tablet         The above medication changes may not have been made by this provider.  Medication list was updated to reflect medications patient is currently taking including medication changes and discontinuations made by other healthcare providers or the patients themselves.     Dictated utilizing Dragon Dictation System.

## 2020-12-04 ENCOUNTER — TELEPHONE (OUTPATIENT)
Dept: CARDIOLOGY | Facility: CLINIC | Age: 55
End: 2020-12-04

## 2020-12-04 VITALS
OXYGEN SATURATION: 98 % | WEIGHT: 282 LBS | BODY MASS INDEX: 37.37 KG/M2 | HEIGHT: 73 IN | DIASTOLIC BLOOD PRESSURE: 80 MMHG | SYSTOLIC BLOOD PRESSURE: 130 MMHG | HEART RATE: 156 BPM

## 2020-12-04 PROBLEM — E78.5 HYPERLIPIDEMIA: Status: ACTIVE | Noted: 2020-12-04

## 2020-12-04 PROBLEM — I77.810 ASCENDING AORTA DILATION (HCC): Status: ACTIVE | Noted: 2020-12-04

## 2020-12-04 PROBLEM — R07.9 CHEST PAIN: Status: ACTIVE | Noted: 2020-12-04

## 2020-12-04 PROBLEM — R29.818 SUSPECTED SLEEP APNEA: Status: ACTIVE | Noted: 2020-12-04

## 2020-12-04 PROBLEM — R79.89 ELEVATED LFTS: Status: ACTIVE | Noted: 2020-12-04

## 2020-12-04 PROBLEM — F10.10 ALCOHOL ABUSE, DAILY USE: Status: ACTIVE | Noted: 2020-12-04

## 2020-12-04 NOTE — TELEPHONE ENCOUNTER
Susanna - can you please call  PCP office per patient request and let them know I am recommending holding of on [facility] PT until we get AF under control. You can also please make sure they get a copy of my office note. Thanks.   Jessica- I don't think it is available yet but can you please request ziopatch results from [facility] that he just recently had done? Thanks.  Kala/brandon- I talked to Dr. Falcon. Please cancel 12/9 holter appt. Thank you.

## 2020-12-10 ENCOUNTER — HOSPITAL ENCOUNTER (OUTPATIENT)
Dept: CARDIOLOGY | Facility: HOSPITAL | Age: 55
Discharge: HOME | End: 2020-12-10
Admitting: NURSE PRACTITIONER

## 2020-12-10 ENCOUNTER — TELEPHONE (OUTPATIENT)
Dept: CARDIOLOGY | Facility: CLINIC | Age: 55
End: 2020-12-10

## 2020-12-10 VITALS — HEIGHT: 73 IN | BODY MASS INDEX: 37.37 KG/M2 | WEIGHT: 282 LBS

## 2020-12-10 DIAGNOSIS — E66.9 CLASS 2 OBESITY WITH BODY MASS INDEX (BMI) OF 37.0 TO 37.9 IN ADULT, UNSPECIFIED OBESITY TYPE, UNSPECIFIED WHETHER SERIOUS COMORBIDITY PRESENT: ICD-10-CM

## 2020-12-10 DIAGNOSIS — R07.9 CHEST PAIN, UNSPECIFIED TYPE: ICD-10-CM

## 2020-12-10 DIAGNOSIS — I48.91 ATRIAL FIBRILLATION WITH RVR (HCC): ICD-10-CM

## 2020-12-10 DIAGNOSIS — H91.90 DEAFNESS, UNSPECIFIED LATERALITY: ICD-10-CM

## 2020-12-10 DIAGNOSIS — I10 ESSENTIAL HYPERTENSION: ICD-10-CM

## 2020-12-10 LAB
BH CV NUCLEAR PRIOR STUDY: 2
BH CV STRESS BP STAGE 1: NORMAL
BH CV STRESS COMMENTS STAGE 1: NORMAL
BH CV STRESS DOSE REGADENOSON STAGE 1: 0.4
BH CV STRESS DURATION MIN STAGE 1: 0
BH CV STRESS DURATION SEC STAGE 1: 10
BH CV STRESS HR STAGE 1: 82
BH CV STRESS PROTOCOL 1: NORMAL
BH CV STRESS RECOVERY BP: NORMAL MMHG
BH CV STRESS RECOVERY HR: 72 BPM
BH CV STRESS STAGE 1: 1
LV EF NUC BP: 65 %
MAXIMAL PREDICTED HEART RATE: 167 BPM
PERCENT MAX PREDICTED HR: 49.1 %
STRESS BASELINE BP: NORMAL MMHG
STRESS BASELINE HR: 59 BPM
STRESS PERCENT HR: 58 %
STRESS POST EXERCISE DUR SEC: 10 SEC
STRESS POST PEAK BP: NORMAL MMHG
STRESS POST PEAK HR: 82 BPM
STRESS TARGET HR: 142 BPM

## 2020-12-10 PROCEDURE — A9555 RB82 RUBIDIUM: HCPCS | Performed by: NURSE PRACTITIONER

## 2020-12-10 PROCEDURE — 78492 MYOCRD IMG PET MLT RST&STRS: CPT | Performed by: INTERNAL MEDICINE

## 2020-12-10 PROCEDURE — 78492 MYOCRD IMG PET MLT RST&STRS: CPT

## 2020-12-10 PROCEDURE — 93016 CV STRESS TEST SUPVJ ONLY: CPT | Performed by: INTERNAL MEDICINE

## 2020-12-10 PROCEDURE — 93018 CV STRESS TEST I&R ONLY: CPT | Performed by: INTERNAL MEDICINE

## 2020-12-10 PROCEDURE — 25010000002 REGADENOSON 0.4 MG/5ML SOLUTION: Performed by: NURSE PRACTITIONER

## 2020-12-10 PROCEDURE — 0 RUBIDIUM CHLORIDE: Performed by: NURSE PRACTITIONER

## 2020-12-10 PROCEDURE — 93017 CV STRESS TEST TRACING ONLY: CPT

## 2020-12-10 RX ADMIN — REGADENOSON 0.4 MG: 0.08 INJECTION, SOLUTION INTRAVENOUS at 13:30

## 2020-12-10 NOTE — TELEPHONE ENCOUNTER
Dr. Falcon-  I got patient's Zio patch from [facility]  and placed them in your in basket.  Looks like he had one pause about 3 seconds and looks like he is having some fast VT (7 and 14 beats-rates up to 218) and 5% A. fib flutter burden with average rate in the 120s longest episode was 3 hours.  Minimum heart rate 41 Max 218, average 77.  He has his stress PET scheduled for today.  I just wanted you to be able to see his Zio patch results and let me know if you want to make any changes or wait until we get his stress test results. Of note when I saw him last week we started amiodarone  200 mg daily until we determined if he would be a candidate for flecainide after ischemic work up. He is on 12.5 mg Toprol XL (has had some low BPs so it was not increased). He is on AC with apixaban 5 mg BID.    Thanks.

## 2020-12-10 NOTE — TELEPHONE ENCOUNTER
I will take care of that too.  I am going to get a cath either way at this point because of the VT.    Message text       You  Carlos A Falcon MD 6 minutes ago (4:54 PM)     No problem, my pleasure. Will you also address his stress test? I don't have those results yet.    Message text       Carlos A Falcon MD  You 16 minutes ago (4:44 PM)     Mary,     I will take care of this.  Thanks for all of your help.     Bebeto

## 2020-12-11 DIAGNOSIS — Z82.49 FAMILY HISTORY OF CORONARY ARTERY DISEASE: ICD-10-CM

## 2020-12-11 DIAGNOSIS — I47.29 NSVT (NONSUSTAINED VENTRICULAR TACHYCARDIA) (HCC): ICD-10-CM

## 2020-12-11 DIAGNOSIS — R07.2 PRECORDIAL PAIN: Primary | ICD-10-CM

## 2020-12-11 NOTE — TELEPHONE ENCOUNTER
Called and spoke with Darcy, his significant other.  We are going to proceed with a left heart catheterization.  He had NSVT on his monitor, and he also has severe family history of coronary artery disease.  I will keep him on the amiodarone until after the heart catheterization.  If he does not have coronary artery disease, I will likely switch him over to flecainide afterwards.  However, this is another important reason to get the heart catheterization.  Thanks.

## 2020-12-14 ENCOUNTER — TRANSCRIBE ORDERS (OUTPATIENT)
Dept: CARDIOLOGY | Facility: CLINIC | Age: 55
End: 2020-12-14

## 2020-12-14 DIAGNOSIS — Z13.6 SCREENING FOR CARDIOVASCULAR CONDITION: Primary | ICD-10-CM

## 2020-12-14 DIAGNOSIS — Z01.810 PREPROCEDURAL CARDIOVASCULAR EXAMINATION: ICD-10-CM

## 2020-12-14 PROBLEM — I47.29 NSVT (NONSUSTAINED VENTRICULAR TACHYCARDIA): Status: ACTIVE | Noted: 2020-12-14

## 2020-12-14 PROBLEM — Z82.49 FAMILY HISTORY OF CORONARY ARTERY DISEASE: Status: ACTIVE | Noted: 2020-12-14

## 2020-12-14 PROBLEM — R07.2 PRECORDIAL PAIN: Status: ACTIVE | Noted: 2020-12-14

## 2020-12-15 ENCOUNTER — TRANSCRIBE ORDERS (OUTPATIENT)
Dept: SLEEP MEDICINE | Facility: HOSPITAL | Age: 55
End: 2020-12-15

## 2020-12-15 ENCOUNTER — LAB (OUTPATIENT)
Dept: LAB | Facility: HOSPITAL | Age: 55
End: 2020-12-15

## 2020-12-15 ENCOUNTER — TELEPHONE (OUTPATIENT)
Dept: CARDIOLOGY | Facility: CLINIC | Age: 55
End: 2020-12-15

## 2020-12-15 DIAGNOSIS — Z01.818 OTHER SPECIFIED PRE-OPERATIVE EXAMINATION: Primary | ICD-10-CM

## 2020-12-15 DIAGNOSIS — Z01.818 OTHER SPECIFIED PRE-OPERATIVE EXAMINATION: ICD-10-CM

## 2020-12-15 DIAGNOSIS — Z13.6 SCREENING FOR CARDIOVASCULAR CONDITION: ICD-10-CM

## 2020-12-15 DIAGNOSIS — Z01.810 PREPROCEDURAL CARDIOVASCULAR EXAMINATION: ICD-10-CM

## 2020-12-15 LAB
ANION GAP SERPL CALCULATED.3IONS-SCNC: 9.9 MMOL/L (ref 5–15)
BASOPHILS # BLD AUTO: 0.04 10*3/MM3 (ref 0–0.2)
BASOPHILS NFR BLD AUTO: 0.9 % (ref 0–1.5)
BUN SERPL-MCNC: 8 MG/DL (ref 6–20)
BUN/CREAT SERPL: 9.2 (ref 7–25)
CALCIUM SPEC-SCNC: 9.3 MG/DL (ref 8.6–10.5)
CHLORIDE SERPL-SCNC: 100 MMOL/L (ref 98–107)
CO2 SERPL-SCNC: 27.1 MMOL/L (ref 22–29)
CREAT SERPL-MCNC: 0.87 MG/DL (ref 0.76–1.27)
DEPRECATED RDW RBC AUTO: 41.4 FL (ref 37–54)
EOSINOPHIL # BLD AUTO: 0.11 10*3/MM3 (ref 0–0.4)
EOSINOPHIL NFR BLD AUTO: 2.4 % (ref 0.3–6.2)
ERYTHROCYTE [DISTWIDTH] IN BLOOD BY AUTOMATED COUNT: 12.1 % (ref 12.3–15.4)
GFR SERPL CREATININE-BSD FRML MDRD: 92 ML/MIN/1.73
GLUCOSE SERPL-MCNC: 96 MG/DL (ref 65–99)
HCT VFR BLD AUTO: 40.9 % (ref 37.5–51)
HGB BLD-MCNC: 14.3 G/DL (ref 13–17.7)
IMM GRANULOCYTES # BLD AUTO: 0.04 10*3/MM3 (ref 0–0.05)
IMM GRANULOCYTES NFR BLD AUTO: 0.9 % (ref 0–0.5)
LYMPHOCYTES # BLD AUTO: 1.72 10*3/MM3 (ref 0.7–3.1)
LYMPHOCYTES NFR BLD AUTO: 37.6 % (ref 19.6–45.3)
MCH RBC QN AUTO: 32.6 PG (ref 26.6–33)
MCHC RBC AUTO-ENTMCNC: 35 G/DL (ref 31.5–35.7)
MCV RBC AUTO: 93.2 FL (ref 79–97)
MONOCYTES # BLD AUTO: 0.47 10*3/MM3 (ref 0.1–0.9)
MONOCYTES NFR BLD AUTO: 10.3 % (ref 5–12)
NEUTROPHILS NFR BLD AUTO: 2.19 10*3/MM3 (ref 1.7–7)
NEUTROPHILS NFR BLD AUTO: 47.9 % (ref 42.7–76)
NRBC BLD AUTO-RTO: 0 /100 WBC (ref 0–0.2)
PLATELET # BLD AUTO: 221 10*3/MM3 (ref 140–450)
PMV BLD AUTO: 9.2 FL (ref 6–12)
POTASSIUM SERPL-SCNC: 4.1 MMOL/L (ref 3.5–5.2)
RBC # BLD AUTO: 4.39 10*6/MM3 (ref 4.14–5.8)
SODIUM SERPL-SCNC: 137 MMOL/L (ref 136–145)
WBC # BLD AUTO: 4.57 10*3/MM3 (ref 3.4–10.8)

## 2020-12-15 PROCEDURE — U0004 COV-19 TEST NON-CDC HGH THRU: HCPCS

## 2020-12-15 PROCEDURE — 80048 BASIC METABOLIC PNL TOTAL CA: CPT

## 2020-12-15 PROCEDURE — 85025 COMPLETE CBC W/AUTO DIFF WBC: CPT

## 2020-12-15 PROCEDURE — C9803 HOPD COVID-19 SPEC COLLECT: HCPCS

## 2020-12-15 PROCEDURE — 36415 COLL VENOUS BLD VENIPUNCTURE: CPT

## 2020-12-15 NOTE — TELEPHONE ENCOUNTER
Pt case has went peer to peer.  The direct phone number to reach Dr. Holloway for this [id number] is:  [phone]    MEM ID# [id number]    : [id number]    Pt scheduled     Thank you    Nadeem SANDERSON

## 2020-12-16 LAB — SARS-COV-2 RNA RESP QL NAA+PROBE: NOT DETECTED

## 2020-12-16 NOTE — TELEPHONE ENCOUNTER
I spoke with his colleague.  The cath has been approved.  They are going to send the authorization numbers.  Thanks!

## 2020-12-17 ENCOUNTER — HOSPITAL ENCOUNTER (OUTPATIENT)
Facility: HOSPITAL | Age: 55
Setting detail: HOSPITAL OUTPATIENT SURGERY
Discharge: HOME | End: 2020-12-17
Attending: INTERNAL MEDICINE | Admitting: INTERNAL MEDICINE

## 2020-12-17 VITALS
DIASTOLIC BLOOD PRESSURE: 80 MMHG | HEIGHT: 73 IN | TEMPERATURE: 98 F | OXYGEN SATURATION: 98 % | SYSTOLIC BLOOD PRESSURE: 115 MMHG | BODY MASS INDEX: 37.37 KG/M2 | WEIGHT: 282 LBS | HEART RATE: 55 BPM | RESPIRATION RATE: 18 BRPM

## 2020-12-17 DIAGNOSIS — Z82.49 FAMILY HISTORY OF CORONARY ARTERY DISEASE: ICD-10-CM

## 2020-12-17 DIAGNOSIS — R07.2 PRECORDIAL PAIN: ICD-10-CM

## 2020-12-17 DIAGNOSIS — I47.29 NSVT (NONSUSTAINED VENTRICULAR TACHYCARDIA) (HCC): ICD-10-CM

## 2020-12-17 PROCEDURE — 0 IOPAMIDOL PER 1 ML: Performed by: INTERNAL MEDICINE

## 2020-12-17 PROCEDURE — 25010000002 MIDAZOLAM PER 1 MG: Performed by: INTERNAL MEDICINE

## 2020-12-17 PROCEDURE — 93458 L HRT ARTERY/VENTRICLE ANGIO: CPT | Performed by: INTERNAL MEDICINE

## 2020-12-17 PROCEDURE — 25010000002 HEPARIN (PORCINE) PER 1000 UNITS: Performed by: INTERNAL MEDICINE

## 2020-12-17 PROCEDURE — 99152 MOD SED SAME PHYS/QHP 5/>YRS: CPT | Performed by: INTERNAL MEDICINE

## 2020-12-17 PROCEDURE — 25010000002 DIPHENHYDRAMINE PER 50 MG: Performed by: INTERNAL MEDICINE

## 2020-12-17 PROCEDURE — 25010000002 FENTANYL CITRATE (PF) 100 MCG/2ML SOLUTION: Performed by: INTERNAL MEDICINE

## 2020-12-17 PROCEDURE — C1769 GUIDE WIRE: HCPCS | Performed by: INTERNAL MEDICINE

## 2020-12-17 PROCEDURE — 25010000002 ONDANSETRON PER 1 MG: Performed by: INTERNAL MEDICINE

## 2020-12-17 PROCEDURE — C1894 INTRO/SHEATH, NON-LASER: HCPCS | Performed by: INTERNAL MEDICINE

## 2020-12-17 RX ORDER — ACETAMINOPHEN 325 MG/1
650 TABLET ORAL EVERY 4 HOURS PRN
Status: DISCONTINUED | OUTPATIENT
Start: 2020-12-17 | End: 2020-12-17 | Stop reason: HOSPADM

## 2020-12-17 RX ORDER — LIDOCAINE HYDROCHLORIDE 20 MG/ML
INJECTION, SOLUTION INFILTRATION; PERINEURAL AS NEEDED
Status: DISCONTINUED | OUTPATIENT
Start: 2020-12-17 | End: 2020-12-17 | Stop reason: HOSPADM

## 2020-12-17 RX ORDER — SODIUM CHLORIDE 9 MG/ML
1 INJECTION, SOLUTION INTRAVENOUS CONTINUOUS
Status: DISCONTINUED | OUTPATIENT
Start: 2020-12-17 | End: 2020-12-17 | Stop reason: HOSPADM

## 2020-12-17 RX ORDER — ONDANSETRON 2 MG/ML
INJECTION INTRAMUSCULAR; INTRAVENOUS AS NEEDED
Status: DISCONTINUED | OUTPATIENT
Start: 2020-12-17 | End: 2020-12-17 | Stop reason: HOSPADM

## 2020-12-17 RX ORDER — SODIUM CHLORIDE 9 MG/ML
75 INJECTION, SOLUTION INTRAVENOUS CONTINUOUS
Status: DISCONTINUED | OUTPATIENT
Start: 2020-12-17 | End: 2020-12-17 | Stop reason: HOSPADM

## 2020-12-17 RX ORDER — MIDAZOLAM HYDROCHLORIDE 1 MG/ML
INJECTION INTRAMUSCULAR; INTRAVENOUS AS NEEDED
Status: DISCONTINUED | OUTPATIENT
Start: 2020-12-17 | End: 2020-12-17 | Stop reason: HOSPADM

## 2020-12-17 RX ORDER — DIPHENHYDRAMINE HYDROCHLORIDE 50 MG/ML
INJECTION INTRAMUSCULAR; INTRAVENOUS AS NEEDED
Status: DISCONTINUED | OUTPATIENT
Start: 2020-12-17 | End: 2020-12-17 | Stop reason: HOSPADM

## 2020-12-17 RX ORDER — SODIUM CHLORIDE 0.9 % (FLUSH) 0.9 %
3 SYRINGE (ML) INJECTION EVERY 12 HOURS SCHEDULED
Status: DISCONTINUED | OUTPATIENT
Start: 2020-12-17 | End: 2020-12-17 | Stop reason: HOSPADM

## 2020-12-17 RX ORDER — FENTANYL CITRATE 50 UG/ML
INJECTION, SOLUTION INTRAMUSCULAR; INTRAVENOUS AS NEEDED
Status: DISCONTINUED | OUTPATIENT
Start: 2020-12-17 | End: 2020-12-17 | Stop reason: HOSPADM

## 2020-12-17 RX ORDER — SODIUM CHLORIDE 0.9 % (FLUSH) 0.9 %
10 SYRINGE (ML) INJECTION AS NEEDED
Status: DISCONTINUED | OUTPATIENT
Start: 2020-12-17 | End: 2020-12-17 | Stop reason: HOSPADM

## 2020-12-17 RX ORDER — HYDROCODONE BITARTRATE AND ACETAMINOPHEN 7.5; 325 MG/1; MG/1
1 TABLET ORAL ONCE
Status: COMPLETED | OUTPATIENT
Start: 2020-12-17 | End: 2020-12-17

## 2020-12-17 RX ADMIN — SODIUM CHLORIDE 75 ML/HR: 9 INJECTION, SOLUTION INTRAVENOUS at 10:53

## 2020-12-17 RX ADMIN — SODIUM CHLORIDE 75 ML/HR: 9 INJECTION, SOLUTION INTRAVENOUS at 09:17

## 2020-12-17 RX ADMIN — HYDROCODONE BITARTRATE AND ACETAMINOPHEN 1 TABLET: 7.5; 325 TABLET ORAL at 12:08

## 2020-12-17 NOTE — PERIOPERATIVE NURSING NOTE
"aAron Siddiqi RN updated CN on patient's anxiety and  desire to leave the hospital now.  Aaron has explained the need for continued monitoring and the risks involved if he leaves AMA.  I called patient's friend and explained the situation to her and asked her to come in to the hospital to sit with the patient to lessen his anxiety.  Friend refused stating \"I talked to all them hazard people yesterday 2 or 3 of 'em and tried to tell them that I needed to be with him 'cause I know how he is.  He's going to leave because he has lots of anxiety and a phobia about the hospital.\"  I reiterated that I could obtain permission for her to come in, but she declined stating, \"I've been freezing my butt off out here since 8:30. I'm just going to wait it out.\"  Informed her that Aaron would be calling soon with discharge instructions and asked her to remain on campus int he event the patient chooses to leave AMA.  She verbalized understanding.    "

## 2020-12-17 NOTE — DISCHARGE INSTRUCTIONS
[facility]  [address] [place] [address]    Coronary Angiogram (Radial/Ulnar Approach) After Care    Refer to this sheet in the next few weeks. These instructions provide you with information on caring for yourself after your procedure. Your caregiver may also give you more specific instructions. Your treatment has been planned according to current medical practices, but problems sometimes occur. Call your caregiver if you have any problems or questions after your procedure.    Home Care Instructions:  · You may shower the day after the procedure. Remove the bandage (dressing) and gently wash the site with plain soap and water. Gently pat the site dry. You may apply a band aid daily for 2 days if desired.    · Do not apply powder or lotion to the site.  · Do not submerge the affected site in water for 3 to 5 days or until the site is completely healed.   · Do not lift, push or pull anything over 5 pounds for 5 days after your procedure. As a reference, a gallon of milk weighs 8 pounds.   · Inspect the site at least twice daily. You may notice some bruising at the site and it may be tender for 1 to 2 weeks.     · Increase your fluid intake for the next 2 days.    · Keep arm elevated for 24 hours. For the remainder of the day, keep your arm in “Pledge of Allegiance” position when up and about.     · You may drive 24 hours after the procedure unless otherwise instructed by your caregiver.  · Do not operate machinery or power tools for 24 hours.  · A responsible adult should be with you for the first 24 hours after you arrive home. Do not make any important legal decisions or sign legal papers for 24 hours.  Do not drink alcohol for 24 hours.    · Metformin or any medications containing Metformin should not be taken for 48 hours after your procedure.      Call Your Doctor if:   · You have unusual pain at the radial/ulnar (wrist) site.  · You have redness, warmth, swelling, or pain at the  radial/ulnar (wrist) site.  · You have drainage (other than a small amount of blood on the dressing).  · You have chills or a fever > 101.  · Your arm becomes pale or dark, cool, tingly, or numb.  · You have heavy bleeding from the site, hold pressure on the site for 20 minutes.  If the bleeding stops, apply a fresh bandage and call your cardiologist.  However, if you continue to have bleeding, call 911.

## 2020-12-18 DIAGNOSIS — I48.0 PAF (PAROXYSMAL ATRIAL FIBRILLATION) (HCC): Primary | ICD-10-CM

## 2020-12-18 RX ORDER — FLECAINIDE ACETATE 50 MG/1
50 TABLET ORAL 2 TIMES DAILY
Qty: 60 TABLET | Refills: 11 | Status: SHIPPED | OUTPATIENT
Start: 2020-12-18 | End: 2020-12-24

## 2020-12-24 ENCOUNTER — CLINICAL SUPPORT (OUTPATIENT)
Dept: CARDIOLOGY | Facility: CLINIC | Age: 55
End: 2020-12-24

## 2020-12-24 DIAGNOSIS — I48.0 PAF (PAROXYSMAL ATRIAL FIBRILLATION) (HCC): Primary | ICD-10-CM

## 2020-12-24 PROCEDURE — 93000 ELECTROCARDIOGRAM COMPLETE: CPT | Performed by: INTERNAL MEDICINE

## 2020-12-24 RX ORDER — FLECAINIDE ACETATE 50 MG/1
50 TABLET ORAL EVERY 12 HOURS
COMMUNITY
End: 2021-05-25

## 2020-12-24 NOTE — PROGRESS NOTES
Procedure     ECG 12 Lead    Date/Time: 12/24/2020 12:09 PM  Performed by: Dario Huffman MA  Authorized by: Carlos A Falcon MD   Comparison: compared with previous ECG from 12/3/2020  Similar to previous ECG  Rhythm: sinus rhythm  Conduction: non-specific intraventricular conduction delay  Other findings: left atrial abnormality    Clinical impression: abnormal EKG           The patient came in stating that he had been having irregular Hr many times a day and a irregular BP. I checked his BP and it was 100/70 HR 73 O2 99. I also did an ECG and presented it to Dr Falcon and he said it looked good and to advise the patient to stay on his current medications and no changes were needed. I also advised the patient that he needed a follow up appointment with the . The patient was frustrated with this and got up and walked out.

## 2020-12-28 RX ORDER — METOPROLOL SUCCINATE 25 MG/1
12.5 TABLET, EXTENDED RELEASE ORAL DAILY
Qty: 15 TABLET | Refills: 1 | Status: SHIPPED | OUTPATIENT
Start: 2020-12-28 | End: 2021-02-09

## 2020-12-28 RX ORDER — AMIODARONE HYDROCHLORIDE 200 MG/1
200 TABLET ORAL DAILY
Qty: 30 TABLET | Refills: 0 | Status: SHIPPED | OUTPATIENT
Start: 2020-12-28 | End: 2021-05-05 | Stop reason: HOSPADM

## 2021-02-09 RX ORDER — METOPROLOL SUCCINATE 25 MG/1
TABLET, EXTENDED RELEASE ORAL
Qty: 15 TABLET | Refills: 1 | Status: SHIPPED | OUTPATIENT
Start: 2021-02-09 | End: 2021-04-09

## 2021-02-09 NOTE — TELEPHONE ENCOUNTER
Metoprolol Succinate XL   Last office visit 12/3/2020  Last EKG 1/24/2021  Next office visit 5/12/2021  Labs 12/15/2020

## 2021-03-04 ENCOUNTER — HOSPITAL ENCOUNTER (OUTPATIENT)
Dept: SLEEP MEDICINE | Facility: HOSPITAL | Age: 56
Discharge: HOME | End: 2021-03-04
Admitting: INTERNAL MEDICINE

## 2021-03-04 DIAGNOSIS — G47.30 SLEEP APNEA, UNSPECIFIED TYPE: ICD-10-CM

## 2021-03-04 DIAGNOSIS — I48.0 PAROXYSMAL ATRIAL FIBRILLATION (HCC): ICD-10-CM

## 2021-03-04 PROCEDURE — 95806 SLEEP STUDY UNATT&RESP EFFT: CPT

## 2021-03-04 PROCEDURE — 95806 SLEEP STUDY UNATT&RESP EFFT: CPT | Performed by: INTERNAL MEDICINE

## 2021-03-15 ENCOUNTER — TELEPHONE (OUTPATIENT)
Dept: SLEEP MEDICINE | Facility: HOSPITAL | Age: 56
End: 2021-03-15

## 2021-03-15 NOTE — TELEPHONE ENCOUNTER
Spoke with pts caregiver. Scheduled f/u appt to discuss results. Mailed paperwork with [facility]gen[facility] and advised pt to fill out and bring back.

## 2021-03-25 ENCOUNTER — OFFICE VISIT (OUTPATIENT)
Dept: SLEEP MEDICINE | Facility: HOSPITAL | Age: 56
End: 2021-03-25

## 2021-03-25 VITALS — WEIGHT: 287 LBS | BODY MASS INDEX: 38.04 KG/M2 | HEIGHT: 73 IN

## 2021-03-25 DIAGNOSIS — G47.33 OSA (OBSTRUCTIVE SLEEP APNEA): Primary | ICD-10-CM

## 2021-03-25 PROCEDURE — 99204 OFFICE O/P NEW MOD 45 MIN: CPT | Performed by: INTERNAL MEDICINE

## 2021-03-25 PROCEDURE — G0463 HOSPITAL OUTPT CLINIC VISIT: HCPCS

## 2021-03-25 NOTE — PROGRESS NOTES
[facility] New Patient[facility]Consultation       Reason for Consultation: TADEO    Patient Care Team:  JEMIMA Baez MD as PCP - Internal Medicine  Aneudy Ford MD as Consulting Physician (Sleep Medicine)    Chief complaint: TADEO    History of present illness:    Thank you for asking me to see your patient.  The patient is a 55 y.o. male who has progressive deafness (does not sign but communicates with written cues and reading lips), paroxysmal atrial fibrillation, strong family history of coronary artery disease, dilated aortic root, RV dysfunction, and suspected sleep apnea and daily alcohol use.  Cardiology requested a home sleep study.    Home sleep study performed 3/4/2021.  Moderate obstructive sleep apnea with AHI 25 events per hour.  No sleep-related hypoxia noted.  The patient snored 4 percent of total monitoring time.  Sleep evaluation requested.    The patient goes to bed between 11:30 PM and midnight and awakens between 4:30 AM and 5 AM.  The patient falls asleep within 30 minutes.  He is up between 3:30 AM and 4 AM to use the restroom and then gets up at the times listed.  The patient does use the restroom during the nighttime.  Mostly, one time.  The patient does not know about snoring, he lives alone.    Patient takes Ambien or Ambien ER.  He has not had Ambien for up to 2 days and then restarts it.  The patient also takes hydrocodone 3 times a day.    Review of Systems:    A complete review of systems was done and all were negative with the exception of his sinuses not being good, and anxiety and depression    History:  Past Medical History:   Diagnosis Date   • Anxiety    • Deaf    • GERD (gastroesophageal reflux disease)    • HTN (hypertension)    • Insomnia    • Obesity    • TADEO (obstructive sleep apnea) 03/04/2021    Home sleep study.  Weight 287 pounds.  Moderate TADEO with AHI 25 events per hour.  No sleep-related hypoxia.  The patient snored 4% of the total monitoring time.   • PAF  "(paroxysmal atrial fibrillation) (CMS/[facility])    ,   Past Surgical History:   Procedure Laterality Date   • CARDIAC CATHETERIZATION N/A 2020    Procedure: Coronary angiography;  Surgeon: Geri Moya MD;  Location:  [facility];  Service: Cardiovascular;  Laterality: N/A;   • CARDIAC CATHETERIZATION N/A 2020    Procedure: Left heart cath;  Surgeon: Geri Moya MD;  Location:  [facility];  Service: Cardiovascular;  Laterality: N/A;   • CARDIAC CATHETERIZATION N/A 2020    Procedure: Left ventriculography;  Surgeon: Geri Moya MD;  Location:  [facility];  Service: Cardiovascular;  Laterality: N/A;   • HAND SURGERY     • INNER EAR SURGERY     ,   Family History   Problem Relation Age of Onset   • Coronary artery disease Mother         Mother with fatal MI at age 59   • Coronary artery disease Father         Father with MI at age 62   • Prostate cancer Father 59        malignant tumor of prostate  62   • Heart disease Brother 57        Brother with fatal MI at age 57    and   Social History     Socioeconomic History   • Marital status: Single     Spouse name: Not on file   • Number of children: Not on file   • Years of education: Not on file   • Highest education level: Not on file   Tobacco Use   • Smoking status: Never Smoker   • Smokeless tobacco: Never Used   • Tobacco comment: No caffeine   Substance and Sexual Activity   • Alcohol use: Yes     Alcohol/week: 12.0 standard drinks     Types: 12 Cans of beer per week   • Drug use: Never   • Sexual activity: Yes     E-cigarette/Vaping     E-cigarette/Vaping Substances     E-cigarette/Vaping Devices         Social History: No caffeine    Allergies:  Patient has no known allergies.     Medication Review: His list was reviewed    Vital Signs:    Vitals:    21 1055   Weight: 130 kg (287 lb)   Height: 185.4 cm (73\")      Body mass index is 37.87 kg/m².         Physical Exam:  "   Constitutional:  Well developed 55 y.o. male that appears in no apparent distress.  Awake & oriented times 3.  Normal mood with normal recent and remote memory and normal judgement.  Eyes:  Conjunctivae normal.  Oropharynx: Moist mucous membranes without exudate and a large tongue and class III Mallampati airway  Neck: Trachea midline  Respiratory: Effort is not labored  Cardiovascular: Radial pulse regular  Musculoskeletal: Gait appears normal, no digital clubbing evident, no pre-tibial edema    Results Review: I reviewed the results of the home sleep study with him    Impression:   Moderate obstructive sleep apnea by home sleep study 3/4/2021.  No sleep-related hypoxia.  No complaints of hypersomnolence.    Plan:  Good sleep hygiene measures should be maintained.  Weight loss would be beneficial in this patient who is obese.      Pathophysiology of TADEO described to the patient.  Cardiovascular complications of untreated TADEO also reviewed.      After reviewing all with the patient, I would recommend and he is agreeable to proceed with auto titrating CPAP between 8 and 20 cm water pressure.  A DreamWear nasal cushion, #8, medium wide, with large frame recommended.    The patient asked for the DME to text him.  I answered all of his questions.  I will see him back in 8 or 10 weeks.    Thank you for requesting me to assist in this patient's care.    Aneudy Ford MD  Sleep Medicine  03/29/21  07:03 EDT

## 2021-03-29 PROBLEM — G47.33 OSA (OBSTRUCTIVE SLEEP APNEA): Status: ACTIVE | Noted: 2021-03-04

## 2021-04-09 RX ORDER — METOPROLOL SUCCINATE 25 MG/1
TABLET, EXTENDED RELEASE ORAL
Qty: 15 TABLET | Refills: 4 | Status: SHIPPED | OUTPATIENT
Start: 2021-04-09 | End: 2021-07-01

## 2021-05-03 ENCOUNTER — APPOINTMENT (OUTPATIENT)
Dept: GENERAL RADIOLOGY | Facility: HOSPITAL | Age: 56
End: 2021-05-03

## 2021-05-03 ENCOUNTER — HOSPITAL ENCOUNTER (OUTPATIENT)
Facility: HOSPITAL | Age: 56
Setting detail: OBSERVATION
Discharge: HOME | End: 2021-05-05
Attending: INTERNAL MEDICINE | Admitting: INTERNAL MEDICINE

## 2021-05-03 ENCOUNTER — APPOINTMENT (OUTPATIENT)
Dept: CT IMAGING | Facility: HOSPITAL | Age: 56
End: 2021-05-03

## 2021-05-03 DIAGNOSIS — R07.9 CHEST PAIN, UNSPECIFIED TYPE: Primary | ICD-10-CM

## 2021-05-03 DIAGNOSIS — I48.92 ATRIAL FLUTTER WITH RAPID VENTRICULAR RESPONSE (HCC): ICD-10-CM

## 2021-05-03 LAB
ALBUMIN SERPL-MCNC: 4.4 G/DL (ref 3.5–5.2)
ALBUMIN/GLOB SERPL: 1.5 G/DL
ALP SERPL-CCNC: 95 U/L (ref 39–117)
ALT SERPL W P-5'-P-CCNC: 36 U/L (ref 1–41)
ANION GAP SERPL CALCULATED.3IONS-SCNC: 12.5 MMOL/L (ref 5–15)
APTT PPP: 30.3 SECONDS (ref 22.7–35.4)
AST SERPL-CCNC: 25 U/L (ref 1–40)
BASOPHILS # BLD AUTO: 0.05 10*3/MM3 (ref 0–0.2)
BASOPHILS NFR BLD AUTO: 0.7 % (ref 0–1.5)
BILIRUB SERPL-MCNC: 0.5 MG/DL (ref 0–1.2)
BUN SERPL-MCNC: 15 MG/DL (ref 6–20)
BUN/CREAT SERPL: 12.3 (ref 7–25)
CALCIUM SPEC-SCNC: 9.6 MG/DL (ref 8.6–10.5)
CHLORIDE SERPL-SCNC: 103 MMOL/L (ref 98–107)
CO2 SERPL-SCNC: 20.5 MMOL/L (ref 22–29)
CREAT SERPL-MCNC: 1.22 MG/DL (ref 0.76–1.27)
DEPRECATED RDW RBC AUTO: 46.7 FL (ref 37–54)
EOSINOPHIL # BLD AUTO: 0.07 10*3/MM3 (ref 0–0.4)
EOSINOPHIL NFR BLD AUTO: 1 % (ref 0.3–6.2)
ERYTHROCYTE [DISTWIDTH] IN BLOOD BY AUTOMATED COUNT: 13.3 % (ref 12.3–15.4)
GFR SERPL CREATININE-BSD FRML MDRD: 62 ML/MIN/1.73
GLOBULIN UR ELPH-MCNC: 2.9 GM/DL
GLUCOSE SERPL-MCNC: 115 MG/DL (ref 65–99)
HCT VFR BLD AUTO: 47.2 % (ref 37.5–51)
HGB BLD-MCNC: 16.2 G/DL (ref 13–17.7)
HOLD SPECIMEN: NORMAL
HOLD SPECIMEN: NORMAL
IMM GRANULOCYTES # BLD AUTO: 0.08 10*3/MM3 (ref 0–0.05)
IMM GRANULOCYTES NFR BLD AUTO: 1.1 % (ref 0–0.5)
INR PPP: 1.07 (ref 0.9–1.1)
LIPASE SERPL-CCNC: 25 U/L (ref 13–60)
LYMPHOCYTES # BLD AUTO: 2.13 10*3/MM3 (ref 0.7–3.1)
LYMPHOCYTES NFR BLD AUTO: 30.6 % (ref 19.6–45.3)
MCH RBC QN AUTO: 32.5 PG (ref 26.6–33)
MCHC RBC AUTO-ENTMCNC: 34.3 G/DL (ref 31.5–35.7)
MCV RBC AUTO: 94.8 FL (ref 79–97)
MONOCYTES # BLD AUTO: 0.58 10*3/MM3 (ref 0.1–0.9)
MONOCYTES NFR BLD AUTO: 8.3 % (ref 5–12)
NEUTROPHILS NFR BLD AUTO: 4.05 10*3/MM3 (ref 1.7–7)
NEUTROPHILS NFR BLD AUTO: 58.3 % (ref 42.7–76)
NRBC BLD AUTO-RTO: 0 /100 WBC (ref 0–0.2)
NT-PROBNP SERPL-MCNC: 574.8 PG/ML (ref 0–900)
PLATELET # BLD AUTO: 216 10*3/MM3 (ref 140–450)
PMV BLD AUTO: 9.7 FL (ref 6–12)
POTASSIUM SERPL-SCNC: 4 MMOL/L (ref 3.5–5.2)
PROT SERPL-MCNC: 7.3 G/DL (ref 6–8.5)
PROTHROMBIN TIME: 13.7 SECONDS (ref 11.7–14.2)
QT INTERVAL: 373 MS
RBC # BLD AUTO: 4.98 10*6/MM3 (ref 4.14–5.8)
SARS-COV-2 ORF1AB RESP QL NAA+PROBE: NOT DETECTED
SODIUM SERPL-SCNC: 136 MMOL/L (ref 136–145)
TROPONIN T SERPL-MCNC: <0.01 NG/ML (ref 0–0.03)
TROPONIN T SERPL-MCNC: <0.01 NG/ML (ref 0–0.03)
WBC # BLD AUTO: 6.96 10*3/MM3 (ref 3.4–10.8)
WHOLE BLOOD HOLD SPECIMEN: NORMAL
WHOLE BLOOD HOLD SPECIMEN: NORMAL

## 2021-05-03 PROCEDURE — 99285 EMERGENCY DEPT VISIT HI MDM: CPT

## 2021-05-03 PROCEDURE — 25010000002 LORAZEPAM PER 2 MG: Performed by: EMERGENCY MEDICINE

## 2021-05-03 PROCEDURE — G0378 HOSPITAL OBSERVATION PER HR: HCPCS

## 2021-05-03 PROCEDURE — 71275 CT ANGIOGRAPHY CHEST: CPT

## 2021-05-03 PROCEDURE — 80053 COMPREHEN METABOLIC PANEL: CPT | Performed by: EMERGENCY MEDICINE

## 2021-05-03 PROCEDURE — 71045 X-RAY EXAM CHEST 1 VIEW: CPT

## 2021-05-03 PROCEDURE — 83690 ASSAY OF LIPASE: CPT | Performed by: PHYSICIAN ASSISTANT

## 2021-05-03 PROCEDURE — 93010 ELECTROCARDIOGRAM REPORT: CPT | Performed by: INTERNAL MEDICINE

## 2021-05-03 PROCEDURE — 85730 THROMBOPLASTIN TIME PARTIAL: CPT | Performed by: EMERGENCY MEDICINE

## 2021-05-03 PROCEDURE — 93005 ELECTROCARDIOGRAM TRACING: CPT | Performed by: PHYSICIAN ASSISTANT

## 2021-05-03 PROCEDURE — C9803 HOPD COVID-19 SPEC COLLECT: HCPCS

## 2021-05-03 PROCEDURE — 0 IOPAMIDOL PER 1 ML: Performed by: PHYSICIAN ASSISTANT

## 2021-05-03 PROCEDURE — 96375 TX/PRO/DX INJ NEW DRUG ADDON: CPT

## 2021-05-03 PROCEDURE — 85610 PROTHROMBIN TIME: CPT | Performed by: EMERGENCY MEDICINE

## 2021-05-03 PROCEDURE — 96365 THER/PROPH/DIAG IV INF INIT: CPT

## 2021-05-03 PROCEDURE — 25010000002 MAGNESIUM SULFATE 2 GM/50ML SOLUTION: Performed by: EMERGENCY MEDICINE

## 2021-05-03 PROCEDURE — 85025 COMPLETE CBC W/AUTO DIFF WBC: CPT | Performed by: EMERGENCY MEDICINE

## 2021-05-03 PROCEDURE — U0004 COV-19 TEST NON-CDC HGH THRU: HCPCS | Performed by: PHYSICIAN ASSISTANT

## 2021-05-03 PROCEDURE — 84484 ASSAY OF TROPONIN QUANT: CPT | Performed by: EMERGENCY MEDICINE

## 2021-05-03 PROCEDURE — 83880 ASSAY OF NATRIURETIC PEPTIDE: CPT | Performed by: EMERGENCY MEDICINE

## 2021-05-03 PROCEDURE — 25010000002 LORAZEPAM PER 2 MG: Performed by: PHYSICIAN ASSISTANT

## 2021-05-03 PROCEDURE — 25010000002 ONDANSETRON PER 1 MG: Performed by: EMERGENCY MEDICINE

## 2021-05-03 PROCEDURE — 93005 ELECTROCARDIOGRAM TRACING: CPT | Performed by: EMERGENCY MEDICINE

## 2021-05-03 RX ORDER — HYDROCODONE BITARTRATE AND ACETAMINOPHEN 7.5; 325 MG/1; MG/1
1 TABLET ORAL EVERY 8 HOURS PRN
Status: DISCONTINUED | OUTPATIENT
Start: 2021-05-03 | End: 2021-05-04 | Stop reason: SDUPTHER

## 2021-05-03 RX ORDER — ZOLPIDEM TARTRATE 5 MG/1
5 TABLET ORAL NIGHTLY PRN
Status: DISCONTINUED | OUTPATIENT
Start: 2021-05-03 | End: 2021-05-04

## 2021-05-03 RX ORDER — LORAZEPAM 2 MG/ML
1 INJECTION INTRAMUSCULAR ONCE
Status: COMPLETED | OUTPATIENT
Start: 2021-05-03 | End: 2021-05-03

## 2021-05-03 RX ORDER — MORPHINE SULFATE 2 MG/ML
4 INJECTION, SOLUTION INTRAMUSCULAR; INTRAVENOUS ONCE
Status: DISCONTINUED | OUTPATIENT
Start: 2021-05-03 | End: 2021-05-03

## 2021-05-03 RX ORDER — METOPROLOL SUCCINATE 25 MG/1
12.5 TABLET, EXTENDED RELEASE ORAL
Status: DISCONTINUED | OUTPATIENT
Start: 2021-05-04 | End: 2021-05-05 | Stop reason: HOSPADM

## 2021-05-03 RX ORDER — ONDANSETRON 2 MG/ML
4 INJECTION INTRAMUSCULAR; INTRAVENOUS ONCE
Status: COMPLETED | OUTPATIENT
Start: 2021-05-03 | End: 2021-05-03

## 2021-05-03 RX ORDER — NITROGLYCERIN 0.4 MG/1
0.4 TABLET SUBLINGUAL
Status: DISCONTINUED | OUTPATIENT
Start: 2021-05-03 | End: 2021-05-05 | Stop reason: HOSPADM

## 2021-05-03 RX ORDER — MAGNESIUM SULFATE HEPTAHYDRATE 40 MG/ML
2 INJECTION, SOLUTION INTRAVENOUS ONCE
Status: COMPLETED | OUTPATIENT
Start: 2021-05-03 | End: 2021-05-03

## 2021-05-03 RX ORDER — SODIUM CHLORIDE 0.9 % (FLUSH) 0.9 %
10 SYRINGE (ML) INJECTION AS NEEDED
Status: DISCONTINUED | OUTPATIENT
Start: 2021-05-03 | End: 2021-05-05 | Stop reason: HOSPADM

## 2021-05-03 RX ADMIN — ONDANSETRON 4 MG: 2 INJECTION INTRAMUSCULAR; INTRAVENOUS at 18:39

## 2021-05-03 RX ADMIN — METOROPROLOL TARTRATE 5 MG: 5 INJECTION, SOLUTION INTRAVENOUS at 17:57

## 2021-05-03 RX ADMIN — IOPAMIDOL 100 ML: 755 INJECTION, SOLUTION INTRAVENOUS at 20:27

## 2021-05-03 RX ADMIN — MAGNESIUM SULFATE HEPTAHYDRATE 2 G: 2 INJECTION, SOLUTION INTRAVENOUS at 18:03

## 2021-05-03 RX ADMIN — LORAZEPAM 1 MG: 2 INJECTION INTRAMUSCULAR; INTRAVENOUS at 19:32

## 2021-05-03 RX ADMIN — LORAZEPAM 1 MG: 2 INJECTION INTRAMUSCULAR; INTRAVENOUS at 19:03

## 2021-05-03 RX ADMIN — SODIUM CHLORIDE 500 ML: 9 INJECTION, SOLUTION INTRAVENOUS at 17:59

## 2021-05-04 ENCOUNTER — ANESTHESIA (OUTPATIENT)
Dept: CARDIOLOGY | Facility: HOSPITAL | Age: 56
End: 2021-05-04

## 2021-05-04 ENCOUNTER — ANESTHESIA EVENT (OUTPATIENT)
Dept: CARDIOLOGY | Facility: HOSPITAL | Age: 56
End: 2021-05-04

## 2021-05-04 PROBLEM — I48.92 ATRIAL FLUTTER WITH RAPID VENTRICULAR RESPONSE: Status: ACTIVE | Noted: 2021-05-03

## 2021-05-04 LAB
QT INTERVAL: 360 MS
QT INTERVAL: 380 MS

## 2021-05-04 PROCEDURE — 93653 COMPRE EP EVAL TX SVT: CPT | Performed by: INTERNAL MEDICINE

## 2021-05-04 PROCEDURE — C1893 INTRO/SHEATH, FIXED,NON-PEEL: HCPCS | Performed by: INTERNAL MEDICINE

## 2021-05-04 PROCEDURE — 96376 TX/PRO/DX INJ SAME DRUG ADON: CPT

## 2021-05-04 PROCEDURE — 25010000002 FENTANYL CITRATE (PF) 100 MCG/2ML SOLUTION: Performed by: NURSE ANESTHETIST, CERTIFIED REGISTERED

## 2021-05-04 PROCEDURE — 93613 INTRACARDIAC EPHYS 3D MAPG: CPT | Performed by: INTERNAL MEDICINE

## 2021-05-04 PROCEDURE — 25010000002 MIDAZOLAM PER 1 MG: Performed by: ANESTHESIOLOGY

## 2021-05-04 PROCEDURE — 25010000002 HYDROMORPHONE PER 4 MG: Performed by: NURSE ANESTHETIST, CERTIFIED REGISTERED

## 2021-05-04 PROCEDURE — 25010000002 PHENYLEPHRINE PER 1 ML: Performed by: NURSE ANESTHETIST, CERTIFIED REGISTERED

## 2021-05-04 PROCEDURE — 25010000002 LORAZEPAM PER 2 MG: Performed by: NURSE PRACTITIONER

## 2021-05-04 PROCEDURE — 96375 TX/PRO/DX INJ NEW DRUG ADDON: CPT

## 2021-05-04 PROCEDURE — 99214 OFFICE O/P EST MOD 30 MIN: CPT | Performed by: NURSE PRACTITIONER

## 2021-05-04 PROCEDURE — 93621 COMP EP EVL L PAC&REC C SINS: CPT | Performed by: INTERNAL MEDICINE

## 2021-05-04 PROCEDURE — 93005 ELECTROCARDIOGRAM TRACING: CPT | Performed by: NURSE PRACTITIONER

## 2021-05-04 PROCEDURE — 25010000002 SUCCINYLCHOLINE PER 20 MG: Performed by: NURSE ANESTHETIST, CERTIFIED REGISTERED

## 2021-05-04 PROCEDURE — G0378 HOSPITAL OBSERVATION PER HR: HCPCS

## 2021-05-04 PROCEDURE — 25010000002 MORPHINE PER 10 MG: Performed by: INTERNAL MEDICINE

## 2021-05-04 PROCEDURE — 25010000002 PROPOFOL 10 MG/ML EMULSION: Performed by: NURSE ANESTHETIST, CERTIFIED REGISTERED

## 2021-05-04 PROCEDURE — C1731 CATH, EP, 20 OR MORE ELEC: HCPCS | Performed by: INTERNAL MEDICINE

## 2021-05-04 PROCEDURE — C1732 CATH, EP, DIAG/ABL, 3D/VECT: HCPCS | Performed by: INTERNAL MEDICINE

## 2021-05-04 PROCEDURE — C1730 CATH, EP, 19 OR FEW ELECT: HCPCS | Performed by: INTERNAL MEDICINE

## 2021-05-04 PROCEDURE — C1894 INTRO/SHEATH, NON-LASER: HCPCS | Performed by: INTERNAL MEDICINE

## 2021-05-04 PROCEDURE — 99219 PR INITIAL OBSERVATION CARE/DAY 50 MINUTES: CPT | Performed by: INTERNAL MEDICINE

## 2021-05-04 RX ORDER — ACETAMINOPHEN 325 MG/1
650 TABLET ORAL EVERY 4 HOURS PRN
Status: DISCONTINUED | OUTPATIENT
Start: 2021-05-04 | End: 2021-05-05 | Stop reason: HOSPADM

## 2021-05-04 RX ORDER — HYDROMORPHONE HYDROCHLORIDE 1 MG/ML
0.5 INJECTION, SOLUTION INTRAMUSCULAR; INTRAVENOUS; SUBCUTANEOUS
Status: DISCONTINUED | OUTPATIENT
Start: 2021-05-04 | End: 2021-05-04 | Stop reason: HOSPADM

## 2021-05-04 RX ORDER — SODIUM CHLORIDE, SODIUM LACTATE, POTASSIUM CHLORIDE, CALCIUM CHLORIDE 600; 310; 30; 20 MG/100ML; MG/100ML; MG/100ML; MG/100ML
9 INJECTION, SOLUTION INTRAVENOUS CONTINUOUS
Status: DISCONTINUED | OUTPATIENT
Start: 2021-05-04 | End: 2021-05-05 | Stop reason: HOSPADM

## 2021-05-04 RX ORDER — METOPROLOL SUCCINATE 25 MG/1
12.5 TABLET, EXTENDED RELEASE ORAL
Status: DISCONTINUED | OUTPATIENT
Start: 2021-05-04 | End: 2021-05-04 | Stop reason: SDUPTHER

## 2021-05-04 RX ORDER — DEXMEDETOMIDINE HYDROCHLORIDE 4 UG/ML
INJECTION INTRAVENOUS CONTINUOUS PRN
Status: DISCONTINUED | OUTPATIENT
Start: 2021-05-04 | End: 2021-05-04 | Stop reason: SURG

## 2021-05-04 RX ORDER — LORAZEPAM 2 MG/ML
1 INJECTION INTRAMUSCULAR ONCE
Status: COMPLETED | OUTPATIENT
Start: 2021-05-04 | End: 2021-05-04

## 2021-05-04 RX ORDER — SODIUM CHLORIDE 0.9 % (FLUSH) 0.9 %
3 SYRINGE (ML) INJECTION EVERY 12 HOURS SCHEDULED
Status: DISCONTINUED | OUTPATIENT
Start: 2021-05-04 | End: 2021-05-04 | Stop reason: HOSPADM

## 2021-05-04 RX ORDER — PROMETHAZINE HYDROCHLORIDE 25 MG/1
25 SUPPOSITORY RECTAL ONCE AS NEEDED
Status: DISCONTINUED | OUTPATIENT
Start: 2021-05-04 | End: 2021-05-04 | Stop reason: HOSPADM

## 2021-05-04 RX ORDER — FAMOTIDINE 10 MG/ML
20 INJECTION, SOLUTION INTRAVENOUS ONCE
Status: COMPLETED | OUTPATIENT
Start: 2021-05-04 | End: 2021-05-04

## 2021-05-04 RX ORDER — LIDOCAINE HYDROCHLORIDE 20 MG/ML
INJECTION, SOLUTION INFILTRATION; PERINEURAL AS NEEDED
Status: DISCONTINUED | OUTPATIENT
Start: 2021-05-04 | End: 2021-05-04 | Stop reason: SURG

## 2021-05-04 RX ORDER — SODIUM CHLORIDE 0.9 % (FLUSH) 0.9 %
3-10 SYRINGE (ML) INJECTION AS NEEDED
Status: DISCONTINUED | OUTPATIENT
Start: 2021-05-04 | End: 2021-05-04 | Stop reason: HOSPADM

## 2021-05-04 RX ORDER — EPHEDRINE SULFATE 50 MG/ML
5 INJECTION, SOLUTION INTRAVENOUS ONCE AS NEEDED
Status: DISCONTINUED | OUTPATIENT
Start: 2021-05-04 | End: 2021-05-04 | Stop reason: HOSPADM

## 2021-05-04 RX ORDER — SODIUM CHLORIDE 9 MG/ML
INJECTION, SOLUTION INTRAVENOUS CONTINUOUS PRN
Status: DISCONTINUED | OUTPATIENT
Start: 2021-05-04 | End: 2021-05-04 | Stop reason: SURG

## 2021-05-04 RX ORDER — MIDAZOLAM HYDROCHLORIDE 1 MG/ML
1 INJECTION INTRAMUSCULAR; INTRAVENOUS
Status: DISCONTINUED | OUTPATIENT
Start: 2021-05-04 | End: 2021-05-04 | Stop reason: HOSPADM

## 2021-05-04 RX ORDER — SCOLOPAMINE TRANSDERMAL SYSTEM 1 MG/1
1 PATCH, EXTENDED RELEASE TRANSDERMAL
Status: DISCONTINUED | OUTPATIENT
Start: 2021-05-04 | End: 2021-05-05 | Stop reason: HOSPADM

## 2021-05-04 RX ORDER — FENTANYL CITRATE 50 UG/ML
INJECTION, SOLUTION INTRAMUSCULAR; INTRAVENOUS AS NEEDED
Status: DISCONTINUED | OUTPATIENT
Start: 2021-05-04 | End: 2021-05-04 | Stop reason: SURG

## 2021-05-04 RX ORDER — PROPOFOL 10 MG/ML
VIAL (ML) INTRAVENOUS AS NEEDED
Status: DISCONTINUED | OUTPATIENT
Start: 2021-05-04 | End: 2021-05-04 | Stop reason: SURG

## 2021-05-04 RX ORDER — LIDOCAINE HYDROCHLORIDE AND EPINEPHRINE 10; 10 MG/ML; UG/ML
INJECTION, SOLUTION INFILTRATION; PERINEURAL AS NEEDED
Status: DISCONTINUED | OUTPATIENT
Start: 2021-05-04 | End: 2021-05-04 | Stop reason: HOSPADM

## 2021-05-04 RX ORDER — PROMETHAZINE HYDROCHLORIDE 25 MG/1
25 TABLET ORAL ONCE AS NEEDED
Status: DISCONTINUED | OUTPATIENT
Start: 2021-05-04 | End: 2021-05-04 | Stop reason: HOSPADM

## 2021-05-04 RX ORDER — ONDANSETRON 2 MG/ML
4 INJECTION INTRAMUSCULAR; INTRAVENOUS ONCE AS NEEDED
Status: DISCONTINUED | OUTPATIENT
Start: 2021-05-04 | End: 2021-05-04 | Stop reason: HOSPADM

## 2021-05-04 RX ORDER — ZOLPIDEM TARTRATE 5 MG/1
5 TABLET ORAL NIGHTLY PRN
Status: DISCONTINUED | OUTPATIENT
Start: 2021-05-04 | End: 2021-05-05 | Stop reason: HOSPADM

## 2021-05-04 RX ORDER — LABETALOL HYDROCHLORIDE 5 MG/ML
5 INJECTION, SOLUTION INTRAVENOUS
Status: DISCONTINUED | OUTPATIENT
Start: 2021-05-04 | End: 2021-05-04 | Stop reason: HOSPADM

## 2021-05-04 RX ORDER — SUCCINYLCHOLINE CHLORIDE 20 MG/ML
INJECTION INTRAMUSCULAR; INTRAVENOUS AS NEEDED
Status: DISCONTINUED | OUTPATIENT
Start: 2021-05-04 | End: 2021-05-04 | Stop reason: SURG

## 2021-05-04 RX ORDER — MORPHINE SULFATE 2 MG/ML
1 INJECTION, SOLUTION INTRAMUSCULAR; INTRAVENOUS ONCE
Status: COMPLETED | OUTPATIENT
Start: 2021-05-05 | End: 2021-05-04

## 2021-05-04 RX ORDER — DIPHENHYDRAMINE HYDROCHLORIDE 50 MG/ML
12.5 INJECTION INTRAMUSCULAR; INTRAVENOUS
Status: DISCONTINUED | OUTPATIENT
Start: 2021-05-04 | End: 2021-05-04 | Stop reason: HOSPADM

## 2021-05-04 RX ORDER — NALOXONE HCL 0.4 MG/ML
0.2 VIAL (ML) INJECTION AS NEEDED
Status: DISCONTINUED | OUTPATIENT
Start: 2021-05-04 | End: 2021-05-04 | Stop reason: HOSPADM

## 2021-05-04 RX ORDER — FLUMAZENIL 0.1 MG/ML
0.2 INJECTION INTRAVENOUS AS NEEDED
Status: DISCONTINUED | OUTPATIENT
Start: 2021-05-04 | End: 2021-05-04 | Stop reason: HOSPADM

## 2021-05-04 RX ORDER — LIDOCAINE HYDROCHLORIDE 10 MG/ML
0.5 INJECTION, SOLUTION EPIDURAL; INFILTRATION; INTRACAUDAL; PERINEURAL ONCE AS NEEDED
Status: DISCONTINUED | OUTPATIENT
Start: 2021-05-04 | End: 2021-05-04 | Stop reason: HOSPADM

## 2021-05-04 RX ORDER — MORPHINE SULFATE 2 MG/ML
1 INJECTION, SOLUTION INTRAMUSCULAR; INTRAVENOUS ONCE
Status: COMPLETED | OUTPATIENT
Start: 2021-05-04 | End: 2021-05-04

## 2021-05-04 RX ORDER — HYDROCODONE BITARTRATE AND ACETAMINOPHEN 7.5; 325 MG/1; MG/1
1 TABLET ORAL ONCE AS NEEDED
Status: COMPLETED | OUTPATIENT
Start: 2021-05-04 | End: 2021-05-04

## 2021-05-04 RX ORDER — ACETAMINOPHEN 650 MG/1
650 SUPPOSITORY RECTAL EVERY 4 HOURS PRN
Status: DISCONTINUED | OUTPATIENT
Start: 2021-05-04 | End: 2021-05-05 | Stop reason: HOSPADM

## 2021-05-04 RX ORDER — FENTANYL CITRATE 50 UG/ML
50 INJECTION, SOLUTION INTRAMUSCULAR; INTRAVENOUS
Status: DISCONTINUED | OUTPATIENT
Start: 2021-05-04 | End: 2021-05-04 | Stop reason: HOSPADM

## 2021-05-04 RX ORDER — HYDROCODONE BITARTRATE AND ACETAMINOPHEN 5; 325 MG/1; MG/1
1 TABLET ORAL EVERY 4 HOURS PRN
Status: DISCONTINUED | OUTPATIENT
Start: 2021-05-04 | End: 2021-05-04

## 2021-05-04 RX ORDER — OXYCODONE AND ACETAMINOPHEN 7.5; 325 MG/1; MG/1
1 TABLET ORAL ONCE AS NEEDED
Status: DISCONTINUED | OUTPATIENT
Start: 2021-05-04 | End: 2021-05-04 | Stop reason: HOSPADM

## 2021-05-04 RX ORDER — FLECAINIDE ACETATE 50 MG/1
50 TABLET ORAL EVERY 12 HOURS
Status: DISCONTINUED | OUTPATIENT
Start: 2021-05-04 | End: 2021-05-05 | Stop reason: HOSPADM

## 2021-05-04 RX ORDER — ROCURONIUM BROMIDE 10 MG/ML
INJECTION, SOLUTION INTRAVENOUS AS NEEDED
Status: DISCONTINUED | OUTPATIENT
Start: 2021-05-04 | End: 2021-05-04 | Stop reason: SURG

## 2021-05-04 RX ORDER — DIPHENHYDRAMINE HCL 25 MG
25 CAPSULE ORAL
Status: DISCONTINUED | OUTPATIENT
Start: 2021-05-04 | End: 2021-05-04 | Stop reason: HOSPADM

## 2021-05-04 RX ORDER — HYDROCODONE BITARTRATE AND ACETAMINOPHEN 7.5; 325 MG/1; MG/1
1 TABLET ORAL EVERY 4 HOURS PRN
Status: DISCONTINUED | OUTPATIENT
Start: 2021-05-04 | End: 2021-05-04 | Stop reason: SDUPTHER

## 2021-05-04 RX ORDER — HYDROCODONE BITARTRATE AND ACETAMINOPHEN 5; 325 MG/1; MG/1
2 TABLET ORAL EVERY 6 HOURS PRN
Status: DISCONTINUED | OUTPATIENT
Start: 2021-05-04 | End: 2021-05-05 | Stop reason: HOSPADM

## 2021-05-04 RX ORDER — ZOLPIDEM TARTRATE 5 MG/1
5 TABLET ORAL NIGHTLY PRN
Status: DISCONTINUED | OUTPATIENT
Start: 2021-05-04 | End: 2021-05-04 | Stop reason: SDUPTHER

## 2021-05-04 RX ADMIN — FENTANYL CITRATE 100 MCG: 50 INJECTION INTRAMUSCULAR; INTRAVENOUS at 13:43

## 2021-05-04 RX ADMIN — SCOPALAMINE 1 PATCH: 1 PATCH, EXTENDED RELEASE TRANSDERMAL at 13:24

## 2021-05-04 RX ADMIN — APIXABAN 5 MG: 5 TABLET, FILM COATED ORAL at 00:13

## 2021-05-04 RX ADMIN — ZOLPIDEM TARTRATE 5 MG: 5 TABLET ORAL at 00:13

## 2021-05-04 RX ADMIN — HYDROCODONE BITARTRATE AND ACETAMINOPHEN 1 TABLET: 7.5; 325 TABLET ORAL at 00:13

## 2021-05-04 RX ADMIN — PROPOFOL 200 MG: 10 INJECTION, EMULSION INTRAVENOUS at 13:48

## 2021-05-04 RX ADMIN — PHENYLEPHRINE HYDROCHLORIDE 200 MCG: 10 INJECTION INTRAVENOUS at 14:13

## 2021-05-04 RX ADMIN — FAMOTIDINE 20 MG: 10 INJECTION INTRAVENOUS at 13:23

## 2021-05-04 RX ADMIN — FENTANYL CITRATE 50 MCG: 50 INJECTION, SOLUTION INTRAMUSCULAR; INTRAVENOUS at 15:45

## 2021-05-04 RX ADMIN — MIDAZOLAM 2 MG: 1 INJECTION INTRAMUSCULAR; INTRAVENOUS at 13:25

## 2021-05-04 RX ADMIN — APIXABAN 5 MG: 5 TABLET, FILM COATED ORAL at 21:46

## 2021-05-04 RX ADMIN — LORAZEPAM 1 MG: 2 INJECTION INTRAMUSCULAR; INTRAVENOUS at 12:14

## 2021-05-04 RX ADMIN — SUCCINYLCHOLINE CHLORIDE 200 MG: 20 INJECTION, SOLUTION INTRAMUSCULAR; INTRAVENOUS; PARENTERAL at 13:49

## 2021-05-04 RX ADMIN — DEXMEDETOMIDINE HYDROCHLORIDE 0.4 MCG/KG/HR: 4 INJECTION INTRAVENOUS at 13:37

## 2021-05-04 RX ADMIN — HYDROCODONE BITARTRATE AND ACETAMINOPHEN 1 TABLET: 7.5; 325 TABLET ORAL at 11:01

## 2021-05-04 RX ADMIN — PHENYLEPHRINE HYDROCHLORIDE 200 MCG: 10 INJECTION INTRAVENOUS at 14:04

## 2021-05-04 RX ADMIN — PHENYLEPHRINE HYDROCHLORIDE 200 MCG: 10 INJECTION INTRAVENOUS at 14:53

## 2021-05-04 RX ADMIN — PHENYLEPHRINE HYDROCHLORIDE 200 MCG: 10 INJECTION INTRAVENOUS at 14:01

## 2021-05-04 RX ADMIN — METOPROLOL SUCCINATE 12.5 MG: 25 TABLET, EXTENDED RELEASE ORAL at 11:02

## 2021-05-04 RX ADMIN — PHENYLEPHRINE HYDROCHLORIDE 200 MCG: 10 INJECTION INTRAVENOUS at 13:55

## 2021-05-04 RX ADMIN — MORPHINE SULFATE 1 MG: 2 INJECTION, SOLUTION INTRAMUSCULAR; INTRAVENOUS at 20:23

## 2021-05-04 RX ADMIN — FENTANYL CITRATE 50 MCG: 50 INJECTION, SOLUTION INTRAMUSCULAR; INTRAVENOUS at 16:15

## 2021-05-04 RX ADMIN — PHENYLEPHRINE HYDROCHLORIDE 200 MCG: 10 INJECTION INTRAVENOUS at 14:28

## 2021-05-04 RX ADMIN — LIDOCAINE HYDROCHLORIDE 100 MG: 20 INJECTION, SOLUTION INFILTRATION; PERINEURAL at 13:48

## 2021-05-04 RX ADMIN — SODIUM CHLORIDE: 900 INJECTION, SOLUTION INTRAVENOUS at 13:35

## 2021-05-04 RX ADMIN — PROPOFOL 150 MCG/KG/MIN: 10 INJECTION, EMULSION INTRAVENOUS at 13:52

## 2021-05-04 RX ADMIN — HYDROCODONE BITARTRATE AND ACETAMINOPHEN 2 TABLET: 5; 325 TABLET ORAL at 23:31

## 2021-05-04 RX ADMIN — HYDROCODONE BITARTRATE AND ACETAMINOPHEN 1 TABLET: 7.5; 325 TABLET ORAL at 16:12

## 2021-05-04 RX ADMIN — HYDROMORPHONE HYDROCHLORIDE 0.5 MG: 1 INJECTION, SOLUTION INTRAMUSCULAR; INTRAVENOUS; SUBCUTANEOUS at 15:55

## 2021-05-04 RX ADMIN — HYDROCODONE BITARTRATE AND ACETAMINOPHEN 1 TABLET: 5; 325 TABLET ORAL at 18:04

## 2021-05-04 RX ADMIN — MORPHINE SULFATE 1 MG: 2 INJECTION, SOLUTION INTRAMUSCULAR; INTRAVENOUS at 23:59

## 2021-05-04 RX ADMIN — FLECAINIDE ACETATE TABLET 50 MG: 50 TABLET ORAL at 11:01

## 2021-05-04 RX ADMIN — ROCURONIUM BROMIDE 50 MG: 50 INJECTION INTRAVENOUS at 13:53

## 2021-05-04 RX ADMIN — APIXABAN 5 MG: 5 TABLET, FILM COATED ORAL at 11:02

## 2021-05-05 VITALS
BODY MASS INDEX: 41.3 KG/M2 | OXYGEN SATURATION: 94 % | DIASTOLIC BLOOD PRESSURE: 77 MMHG | HEIGHT: 70 IN | SYSTOLIC BLOOD PRESSURE: 120 MMHG | TEMPERATURE: 98 F | WEIGHT: 288.5 LBS | HEART RATE: 61 BPM | RESPIRATION RATE: 16 BRPM

## 2021-05-05 LAB
ANION GAP SERPL CALCULATED.3IONS-SCNC: 10.7 MMOL/L (ref 5–15)
BUN SERPL-MCNC: 15 MG/DL (ref 6–20)
BUN/CREAT SERPL: 15.8 (ref 7–25)
CALCIUM SPEC-SCNC: 8.7 MG/DL (ref 8.6–10.5)
CHLORIDE SERPL-SCNC: 102 MMOL/L (ref 98–107)
CO2 SERPL-SCNC: 22.3 MMOL/L (ref 22–29)
CREAT SERPL-MCNC: 0.95 MG/DL (ref 0.76–1.27)
GFR SERPL CREATININE-BSD FRML MDRD: 83 ML/MIN/1.73
GLUCOSE SERPL-MCNC: 99 MG/DL (ref 65–99)
POTASSIUM SERPL-SCNC: 4.1 MMOL/L (ref 3.5–5.2)
QT INTERVAL: 387 MS
SODIUM SERPL-SCNC: 135 MMOL/L (ref 136–145)

## 2021-05-05 PROCEDURE — G0378 HOSPITAL OBSERVATION PER HR: HCPCS

## 2021-05-05 PROCEDURE — 25010000002 MORPHINE PER 10 MG: Performed by: INTERNAL MEDICINE

## 2021-05-05 PROCEDURE — 93005 ELECTROCARDIOGRAM TRACING: CPT | Performed by: NURSE PRACTITIONER

## 2021-05-05 PROCEDURE — 99217 PR OBSERVATION CARE DISCHARGE MANAGEMENT: CPT | Performed by: NURSE PRACTITIONER

## 2021-05-05 PROCEDURE — 80048 BASIC METABOLIC PNL TOTAL CA: CPT | Performed by: NURSE PRACTITIONER

## 2021-05-05 RX ADMIN — HYDROCODONE BITARTRATE AND ACETAMINOPHEN 2 TABLET: 5; 325 TABLET ORAL at 06:02

## 2021-05-05 RX ADMIN — METOPROLOL SUCCINATE 12.5 MG: 25 TABLET, EXTENDED RELEASE ORAL at 10:35

## 2021-05-05 RX ADMIN — APIXABAN 5 MG: 5 TABLET, FILM COATED ORAL at 10:37

## 2021-05-05 RX ADMIN — FLECAINIDE ACETATE TABLET 50 MG: 50 TABLET ORAL at 10:37

## 2021-05-05 RX ADMIN — ZOLPIDEM TARTRATE 5 MG: 5 TABLET ORAL at 00:00

## 2021-05-05 RX ADMIN — HYDROCODONE BITARTRATE AND ACETAMINOPHEN 2 TABLET: 5; 325 TABLET ORAL at 12:16

## 2021-05-18 NOTE — ANESTHESIA POSTPROCEDURE EVALUATION
"Patient: Aris Cameron    Procedure Summary     Date: 05/04/21 Room / Location: [facility] C[facility]/EP LAB 5 / [facility]    Anesthesia Start: 1335 Anesthesia Stop: 1539    Procedures:       Ablation atrial flutter (N/A )      3D MAPPING CARTO EP (N/A ) Diagnosis:       Atrial flutter with rapid ventricular response (CMS/HCC)      (atrial flutter)    Providers: Terrance Le MD Provider: Kulwinder Ragsdale MD    Anesthesia Type: general ASA Status: 3          Anesthesia Type: general    Vitals  Vitals Value Taken Time   /80 05/04/21 1600   Temp 36.6 °C (97.8 °F) 05/04/21 1600   Pulse 82 05/04/21 1600   Resp 18 05/04/21 1600   SpO2 96 % 05/04/21 1600           Post Anesthesia Care and Evaluation    Patient location during evaluation: bedside  Patient participation: complete - patient participated  Level of consciousness: awake and alert  Pain management: adequate  Airway patency: patent  Anesthetic complications: No anesthetic complications    Cardiovascular status: acceptable  Respiratory status: acceptable  Hydration status: acceptable    Comments: /77 (BP Location: Left arm, Patient Position: Lying)   Pulse 61   Temp 36.7 °C (98 °F) (Oral)   Resp 16   Ht 177.8 cm (70\")   Wt 131 kg (288 lb 8 oz)   SpO2 94%   BMI 41.40 kg/m²       "

## 2021-05-19 ENCOUNTER — OFFICE VISIT (OUTPATIENT)
Dept: SLEEP MEDICINE | Facility: HOSPITAL | Age: 56
End: 2021-05-19

## 2021-05-19 ENCOUNTER — TELEPHONE (OUTPATIENT)
Dept: SLEEP MEDICINE | Facility: HOSPITAL | Age: 56
End: 2021-05-19

## 2021-05-19 VITALS — WEIGHT: 285 LBS | BODY MASS INDEX: 37.77 KG/M2 | HEART RATE: 80 BPM | HEIGHT: 73 IN | OXYGEN SATURATION: 98 %

## 2021-05-19 DIAGNOSIS — G47.31 CSA (CENTRAL SLEEP APNEA): ICD-10-CM

## 2021-05-19 DIAGNOSIS — F51.04 PSYCHOPHYSIOLOGICAL INSOMNIA: ICD-10-CM

## 2021-05-19 DIAGNOSIS — G47.33 OSA (OBSTRUCTIVE SLEEP APNEA): Primary | ICD-10-CM

## 2021-05-19 PROCEDURE — G0463 HOSPITAL OUTPT CLINIC VISIT: HCPCS

## 2021-05-19 PROCEDURE — 99213 OFFICE O/P EST LOW 20 MIN: CPT | Performed by: INTERNAL MEDICINE

## 2021-05-25 ENCOUNTER — APPOINTMENT (OUTPATIENT)
Dept: GENERAL RADIOLOGY | Facility: HOSPITAL | Age: 56
End: 2021-05-25

## 2021-05-25 ENCOUNTER — HOSPITAL ENCOUNTER (EMERGENCY)
Facility: HOSPITAL | Age: 56
Discharge: HOME | End: 2021-05-25
Attending: EMERGENCY MEDICINE | Admitting: EMERGENCY MEDICINE

## 2021-05-25 ENCOUNTER — OFFICE VISIT (OUTPATIENT)
Dept: CARDIOLOGY | Facility: CLINIC | Age: 56
End: 2021-05-25

## 2021-05-25 VITALS
OXYGEN SATURATION: 94 % | RESPIRATION RATE: 16 BRPM | WEIGHT: 290 LBS | TEMPERATURE: 97.3 F | HEART RATE: 54 BPM | DIASTOLIC BLOOD PRESSURE: 71 MMHG | HEIGHT: 73 IN | SYSTOLIC BLOOD PRESSURE: 118 MMHG | BODY MASS INDEX: 38.43 KG/M2

## 2021-05-25 VITALS — BODY MASS INDEX: 37.6 KG/M2 | HEIGHT: 73 IN

## 2021-05-25 DIAGNOSIS — I48.91 ATRIAL FIBRILLATION WITH RVR (HCC): Primary | ICD-10-CM

## 2021-05-25 DIAGNOSIS — R00.2 PALPITATIONS: Primary | ICD-10-CM

## 2021-05-25 LAB
ALBUMIN SERPL-MCNC: 4.2 G/DL (ref 3.5–5.2)
ALBUMIN/GLOB SERPL: 1.8 G/DL
ALP SERPL-CCNC: 85 U/L (ref 39–117)
ALT SERPL W P-5'-P-CCNC: 28 U/L (ref 1–41)
ANION GAP SERPL CALCULATED.3IONS-SCNC: 8.3 MMOL/L (ref 5–15)
AST SERPL-CCNC: 21 U/L (ref 1–40)
BASOPHILS # BLD AUTO: 0.02 10*3/MM3 (ref 0–0.2)
BASOPHILS NFR BLD AUTO: 0.5 % (ref 0–1.5)
BILIRUB SERPL-MCNC: 0.4 MG/DL (ref 0–1.2)
BUN SERPL-MCNC: 10 MG/DL (ref 6–20)
BUN/CREAT SERPL: 9 (ref 7–25)
CALCIUM SPEC-SCNC: 9 MG/DL (ref 8.6–10.5)
CHLORIDE SERPL-SCNC: 106 MMOL/L (ref 98–107)
CO2 SERPL-SCNC: 21.7 MMOL/L (ref 22–29)
CREAT SERPL-MCNC: 1.11 MG/DL (ref 0.76–1.27)
DEPRECATED RDW RBC AUTO: 44.8 FL (ref 37–54)
EOSINOPHIL # BLD AUTO: 0.03 10*3/MM3 (ref 0–0.4)
EOSINOPHIL NFR BLD AUTO: 0.8 % (ref 0.3–6.2)
ERYTHROCYTE [DISTWIDTH] IN BLOOD BY AUTOMATED COUNT: 13 % (ref 12.3–15.4)
GFR SERPL CREATININE-BSD FRML MDRD: 69 ML/MIN/1.73
GLOBULIN UR ELPH-MCNC: 2.4 GM/DL
GLUCOSE SERPL-MCNC: 112 MG/DL (ref 65–99)
HCT VFR BLD AUTO: 41.3 % (ref 37.5–51)
HGB BLD-MCNC: 14.2 G/DL (ref 13–17.7)
HOLD SPECIMEN: NORMAL
HOLD SPECIMEN: NORMAL
IMM GRANULOCYTES # BLD AUTO: 0.02 10*3/MM3 (ref 0–0.05)
IMM GRANULOCYTES NFR BLD AUTO: 0.5 % (ref 0–0.5)
LYMPHOCYTES # BLD AUTO: 1.45 10*3/MM3 (ref 0.7–3.1)
LYMPHOCYTES NFR BLD AUTO: 37.6 % (ref 19.6–45.3)
MAGNESIUM SERPL-MCNC: 2.1 MG/DL (ref 1.6–2.6)
MCH RBC QN AUTO: 32.6 PG (ref 26.6–33)
MCHC RBC AUTO-ENTMCNC: 34.4 G/DL (ref 31.5–35.7)
MCV RBC AUTO: 94.9 FL (ref 79–97)
MONOCYTES # BLD AUTO: 0.44 10*3/MM3 (ref 0.1–0.9)
MONOCYTES NFR BLD AUTO: 11.4 % (ref 5–12)
NEUTROPHILS NFR BLD AUTO: 1.9 10*3/MM3 (ref 1.7–7)
NEUTROPHILS NFR BLD AUTO: 49.2 % (ref 42.7–76)
NRBC BLD AUTO-RTO: 0 /100 WBC (ref 0–0.2)
NT-PROBNP SERPL-MCNC: 154.4 PG/ML (ref 0–900)
PLATELET # BLD AUTO: 220 10*3/MM3 (ref 140–450)
PMV BLD AUTO: 9.5 FL (ref 6–12)
POTASSIUM SERPL-SCNC: 4.3 MMOL/L (ref 3.5–5.2)
PROT SERPL-MCNC: 6.6 G/DL (ref 6–8.5)
QT INTERVAL: 411 MS
RBC # BLD AUTO: 4.35 10*6/MM3 (ref 4.14–5.8)
SODIUM SERPL-SCNC: 136 MMOL/L (ref 136–145)
TROPONIN T SERPL-MCNC: <0.01 NG/ML (ref 0–0.03)
WBC # BLD AUTO: 3.86 10*3/MM3 (ref 3.4–10.8)
WHOLE BLOOD HOLD SPECIMEN: NORMAL
WHOLE BLOOD HOLD SPECIMEN: NORMAL

## 2021-05-25 PROCEDURE — 93005 ELECTROCARDIOGRAM TRACING: CPT | Performed by: EMERGENCY MEDICINE

## 2021-05-25 PROCEDURE — 84484 ASSAY OF TROPONIN QUANT: CPT | Performed by: NURSE PRACTITIONER

## 2021-05-25 PROCEDURE — 93010 ELECTROCARDIOGRAM REPORT: CPT | Performed by: INTERNAL MEDICINE

## 2021-05-25 PROCEDURE — 83735 ASSAY OF MAGNESIUM: CPT | Performed by: NURSE PRACTITIONER

## 2021-05-25 PROCEDURE — 80053 COMPREHEN METABOLIC PANEL: CPT | Performed by: NURSE PRACTITIONER

## 2021-05-25 PROCEDURE — 85025 COMPLETE CBC W/AUTO DIFF WBC: CPT | Performed by: NURSE PRACTITIONER

## 2021-05-25 PROCEDURE — 93005 ELECTROCARDIOGRAM TRACING: CPT

## 2021-05-25 PROCEDURE — 83880 ASSAY OF NATRIURETIC PEPTIDE: CPT | Performed by: EMERGENCY MEDICINE

## 2021-05-25 PROCEDURE — 99283 EMERGENCY DEPT VISIT LOW MDM: CPT

## 2021-05-25 NOTE — ED NOTES
Pt to ED, sent from dr neville office for irregular HR, family states pt has had ablation with dr estrella, on flecainide and lopressor at home, hx of aflutter, states he became SOA with chest tightness this am while in cardio office. Denies any s/s now. Pt is deaf, does read lips well     Pt wearing face mask during their stay in ER. This RN wore face googles, mask and gloves while providing patient care.        Angely Us, RN  05/25/21 1011       Angely Us, RN  05/25/21 1033

## 2021-05-25 NOTE — ED TRIAGE NOTES
Tachycardia and SOA onset last PM- was seen at [facility] last PM for same and DC. Went to Dr Rojas today and sent here. 3 weeks post ablation with Dr Le     Mask placed on patient in triage. Triage staff wore appropriate PPE during interaction with patient.

## 2021-05-25 NOTE — ED PROVIDER NOTES
I supervised care provided by the midlevel provider.   We have discussed this patient's history, physical exam, and treatment plan.  I have reviewed the note and personally saw and examined the patient and agree with the plan of care.   I have seen and examined this patient.  When I first walked into the room he is sleeping.  He is easily arousable.  The patient reads lips very well but he is deaf.  He states that since the ablation he has been having episodic episodes where his heart beats fast and you will get shortness of breath with this.  They will occur suddenly and they are very in duration and length.  Currently he is asymptomatic and feels fine.  Denies any pain, feels like his heartbeat is normal denies any shortness of breath.  He was seen by Dr. Oconnell's office at [facility] and they sent him down here I believe because he had a fast heart rate.  Has been compliant with the Eliquis with his last dose this morning.  He is also been compliant with his metoprolol which he takes twice a day.  The ER physician last night instructed him not to take his Tambocor and his last dose of Tambocor was last night.  Please see medical history reviewed at the bottom of this chart.    GENERAL: not distressed  HENT: nares patent  Head/neck/ face are symmetric without gross deformity or swelling  EYES: no scleral icterus  CV: regular rhythm, regular rate with intact distal pulses  RESPIRATORY: normal effort and no respiratory distress  ABDOMEN: soft and non-tender  MUSCULOSKELETAL: no deformity  NEURO: alert and appropriate, moves all extremities, follows commands  SKIN: warm, dry    Vital signs and nursing notes reviewed.    Plan I reviewed his lab work and his chest x-ray and his EKG.  We will consult cardiology to see what their plan is.  He is stable in no distress  EKG reading  EKG was done at 946  Rate of 61 and it is sinus rhythm  There is some intraventricular conduction delay  Some nonspecific T wave  changes in lead III  I do not see any acute injury pattern  QT looks normal on this EKG  I compared to his previous EKG from 5/5/2021 and there is no change.  We are currently under a pandemic from the COVID19 infection.  The patient presented to the emergency department by ambulance or personal vehicle. I followed the current protocols required by Infection Control at [facility] in my evaluation and treatment of the patient. The patient was wearing a face mask during my evaluation and throughout my encounter. During my whole encounter with this patient I used appropriate personal protective equipment.  This equipment consisted of eye protection, facemask, gown, and gloves.  I applied this equipment before entering the room.      ED Course as of May 25 1910   Tue May 25, 2021   1130 Pt refused chest xray     [JS]   1420 I talked with Dr. Oconnell and Maira the nurse.  The patient appeared to have a panic attack upstairs.  And they sent him down here.  Since he has been down here for 4 hours and 47 minutes.  His heart rate is been in the 50s to 60s and normal sinus rhythm.  He has had no bouts of tachycardia.  His O2 sats been 100% on room air and his blood pressures been normal.  There is times where he will fall asleep.  In talking with the cardiologist he wants him to stop the Tambocor but continue the beta-blocker and Eliquis.  They will arrange follow-up for him.    [MM]   1421 I talked with the patient and spouse that arrived back in the room at length.  Informed the conversation I had with the cardiologist as well as the results of all tests, his cath report, and his lab work and evaluation here.  I believe he has bouts of atrial fibrillation and flutter but I believe he also has likely some panic and anxiety disorders.  He has a long history of anxiety and has been on Xanax in the past.  Informed him and his spouse that he needs to follow-up with his primary care doctor and potential  psychiatrist for his anxiety.  All questions answered.    [MM]      ED Course User Index  [JS] Angely Blair NGCO Hernandez  [MM] Andrew Serna MD       I looked at labs from yesterday.  Patient has a BNP of 224 CMP was unremarkable other than a glucose of 133.  CBC was unremarkable PTT was 38.7 PT was 11.4 patient had 2 - troponins.  Chest x-ray was also unremarkable.    .  Patient was seen by nurse with [facility] and was sent down to the emergency department because of tachycardia and shortness of air.  Was seen last night in [facility] for the same and was discharged.  He is 3-week status post ablation with Dr. Haney.  In looking at the records from his recent ablation when he was discharged 5/3/2021.  He has had a history of atrial fibrillation and flutter.  And a negative CTA of the chest and his troponins were negative.  Patient had a cardiac cath December 2020 which showed normal coronary arteries and normal left ventricular ejection fraction and filling pressures.       Andrew Serna MD  05/25/21 1910

## 2021-05-25 NOTE — ED PROVIDER NOTES
EMERGENCY DEPARTMENT ENCOUNTER    Room Number:  24/24  Date of encounter:  5/25/2021  PCP: No primary care provider on file.  Historian: patient, significant other-Toya   Full history not obtainable due to: none     HPI:  Chief Complaint: Palpitations     Context: Aris Cameron is a 55 y.o. male with a hx of atrial fibrillation with RVR who presents to the ED c/o palpitations. Palpitations are intermittent. Can occur at rest. severe at times.  Occurs several times daily. Recent ablation by Dr Le 3 weeks prior. He states he has been having palpitations since the ablation and runs of tachycardia with HR of 120. He becomes soa during these episodes and feels like he going to pass out. He also notes CP when his HR >120. He reports no improvement since his ablation. He was seen at [facility] last pm and discharged and saw Dr Sue this am who referred him to the ER for further evaluation. He takes flecanide but was told to stop taking it last night by the ER physician. Anticoagulated on eliquis.     He was instructed to quit drinking ETOH after his ablation which he reports he has been compliant with but it has not helped his symptoms.         PAST MEDICAL HISTORY    Active Ambulatory Problems     Diagnosis Date Noted   • Atrial fibrillation with RVR (CMS/HCC) 10/22/2020   • HTN (hypertension) 10/22/2020   • Deaf 10/23/2020   • Obesity 10/22/2020   • Aortic root dilation (CMS/HCC) 12/03/2020   • Ascending aorta dilation (CMS/HCC) 12/04/2020   • Hyperlipidemia 12/04/2020   • Alcohol abuse, daily use 12/04/2020   • Suspected sleep apnea 12/04/2020   • Chest pain 12/04/2020   • Elevated LFTs 12/04/2020   • Precordial pain 12/14/2020   • NSVT (nonsustained ventricular tachycardia) (CMS/HCC) 12/14/2020   • Family history of coronary artery disease 12/14/2020   • TADEO (obstructive sleep apnea) 03/04/2021   • Atrial flutter with rapid ventricular response (CMS/HCC) 05/03/2021     Resolved Ambulatory Problems      Diagnosis Date Noted   • No Resolved Ambulatory Problems     Past Medical History:   Diagnosis Date   • Anxiety    • CAD (coronary artery disease)    • GERD (gastroesophageal reflux disease)    • Insomnia    • Musculoskeletal chest pain    • Neck pain    • PAF (paroxysmal atrial fibrillation) (CMS/[facility])          PAST SURGICAL HISTORY  Past Surgical History:   Procedure Laterality Date   • CARDIAC CATHETERIZATION N/A 2020    Procedure: Coronary angiography;  Surgeon: Geri Moya MD;  Location:  [facility];  Service: Cardiovascular;  Laterality: N/A;   • CARDIAC CATHETERIZATION N/A 2020    Procedure: Left heart cath;  Surgeon: Geri Moya MD;  Location:  [facility];  Service: Cardiovascular;  Laterality: N/A;   • CARDIAC CATHETERIZATION N/A 2020    Procedure: Left ventriculography;  Surgeon: Geri Moya MD;  Location:  [facility];  Service: Cardiovascular;  Laterality: N/A;   • CARDIAC ELECTROPHYSIOLOGY PROCEDURE N/A 2021    Procedure: Ablation atrial flutter;  Surgeon: Terrance Le MD;  Location:  [facility];  Service: Cardiovascular;  Laterality: N/A;   • HAND SURGERY     • INNER EAR SURGERY           FAMILY HISTORY  Family History   Problem Relation Age of Onset   • Coronary artery disease Mother         Mother with fatal MI at age 59   • Coronary artery disease Father         Father with MI at age 62   • Prostate cancer Father 59        malignant tumor of prostate  62   • Heart disease Brother 57        Brother with fatal MI at age 57         SOCIAL HISTORY  Social History     Socioeconomic History   • Marital status: Single     Spouse name: Not on file   • Number of children: Not on file   • Years of education: Not on file   • Highest education level: Not on file   Tobacco Use   • Smoking status: Never Smoker   • Smokeless tobacco: Never Used   • Tobacco comment: No caffeine   Vaping Use   • Vaping Use:  Never used   Substance and Sexual Activity   • Alcohol use: Yes     Alcohol/week: 12.0 standard drinks     Types: 12 Cans of beer per week   • Drug use: Never   • Sexual activity: Yes         ALLERGIES  Patient has no known allergies.        REVIEW OF SYSTEMS  Review of Systems   All systems reviewed and marked as negative except as listed in HPI       PHYSICAL EXAM    I have reviewed the triage vital signs and nursing notes.    ED Triage Vitals   Temp Heart Rate Resp BP SpO2   05/25/21 0938 05/25/21 0938 05/25/21 0938 05/25/21 1005 05/25/21 0938   97.3 °F (36.3 °C) 78 16 107/64 100 %      Temp src Heart Rate Source Patient Position BP Location FiO2 (%)   05/25/21 0938 05/25/21 1005 -- -- --   Tympanic Monitor          GENERAL: alert well developed, well nourished in no distress  HENT: NCAT, neck supple, trachea midline  EYES: no scleral icterus, PERRL, normal conjunctivae  CV: regular rhythm, bradycardic rate, no murmur  RESPIRATORY: unlabored effort, CTAB  ABDOMEN: soft, nontender, nondistended, bowel sounds present  MUSCULOSKELETAL: no gross deformity  NEURO: alert,  sensory and motor function of extremities grossly intact, speech clear, mental status normal/baseline  SKIN: warm, dry, no rash  PSYCH:  Appropriate mood and affect    Vital signs and nursing notes reviewed.          LAB RESULTS  Recent Results (from the past 24 hour(s))   TROPONIN (IN-HOUSE)    Collection Time: 05/24/21  7:24 PM    Specimen: Fresh Frozen Plasma    Specimen type and source: Plasma, Blood   Result Value Ref Range    Troponin I 0.011 0.000 - 0.034 ng/mL   ECG 12 Lead    Collection Time: 05/25/21  9:46 AM   Result Value Ref Range    QT Interval 411 ms   Light Blue Top    Collection Time: 05/25/21 10:04 AM   Result Value Ref Range    Extra Tube hold for add-on    Green Top (Gel)    Collection Time: 05/25/21 10:04 AM   Result Value Ref Range    Extra Tube Hold for add-ons.    Lavender Top    Collection Time: 05/25/21 10:04 AM   Result  Value Ref Range    Extra Tube hold for add-on    Gold Top - SST    Collection Time: 05/25/21 10:04 AM   Result Value Ref Range    Extra Tube Hold for add-ons.    Comprehensive Metabolic Panel    Collection Time: 05/25/21 10:04 AM    Specimen: Blood   Result Value Ref Range    Glucose 112 (H) 65 - 99 mg/dL    BUN 10 6 - 20 mg/dL    Creatinine 1.11 0.76 - 1.27 mg/dL    Sodium 136 136 - 145 mmol/L    Potassium 4.3 3.5 - 5.2 mmol/L    Chloride 106 98 - 107 mmol/L    CO2 21.7 (L) 22.0 - 29.0 mmol/L    Calcium 9.0 8.6 - 10.5 mg/dL    Total Protein 6.6 6.0 - 8.5 g/dL    Albumin 4.20 3.50 - 5.20 g/dL    ALT (SGPT) 28 1 - 41 U/L    AST (SGOT) 21 1 - 40 U/L    Alkaline Phosphatase 85 39 - 117 U/L    Total Bilirubin 0.4 0.0 - 1.2 mg/dL    eGFR Non African Amer 69 >60 mL/min/1.73    Globulin 2.4 gm/dL    A/G Ratio 1.8 g/dL    BUN/Creatinine Ratio 9.0 7.0 - 25.0    Anion Gap 8.3 5.0 - 15.0 mmol/L   Magnesium    Collection Time: 05/25/21 10:04 AM    Specimen: Blood   Result Value Ref Range    Magnesium 2.1 1.6 - 2.6 mg/dL   Troponin    Collection Time: 05/25/21 10:04 AM    Specimen: Blood   Result Value Ref Range    Troponin T <0.010 0.000 - 0.030 ng/mL   CBC Auto Differential    Collection Time: 05/25/21 10:04 AM    Specimen: Blood   Result Value Ref Range    WBC 3.86 3.40 - 10.80 10*3/mm3    RBC 4.35 4.14 - 5.80 10*6/mm3    Hemoglobin 14.2 13.0 - 17.7 g/dL    Hematocrit 41.3 37.5 - 51.0 %    MCV 94.9 79.0 - 97.0 fL    MCH 32.6 26.6 - 33.0 pg    MCHC 34.4 31.5 - 35.7 g/dL    RDW 13.0 12.3 - 15.4 %    RDW-SD 44.8 37.0 - 54.0 fl    MPV 9.5 6.0 - 12.0 fL    Platelets 220 140 - 450 10*3/mm3    Neutrophil % 49.2 42.7 - 76.0 %    Lymphocyte % 37.6 19.6 - 45.3 %    Monocyte % 11.4 5.0 - 12.0 %    Eosinophil % 0.8 0.3 - 6.2 %    Basophil % 0.5 0.0 - 1.5 %    Immature Grans % 0.5 0.0 - 0.5 %    Neutrophils, Absolute 1.90 1.70 - 7.00 10*3/mm3    Lymphocytes, Absolute 1.45 0.70 - 3.10 10*3/mm3    Monocytes, Absolute 0.44 0.10 - 0.90  10*3/mm3    Eosinophils, Absolute 0.03 0.00 - 0.40 10*3/mm3    Basophils, Absolute 0.02 0.00 - 0.20 10*3/mm3    Immature Grans, Absolute 0.02 0.00 - 0.05 10*3/mm3    nRBC 0.0 0.0 - 0.2 /100 WBC   BNP    Collection Time: 05/25/21 10:04 AM    Specimen: Blood   Result Value Ref Range    proBNP 154.4 0.0 - 900.0 pg/mL       Ordered the above labs and independently reviewed the results.        RADIOLOGY  No Radiology Exams Resulted Within Past 24 Hours    I ordered the above noted radiological studies. Independently reviewed by me and discussed with radiologist.  See dictation above for official radiology interpretation.      PROCEDURES    Procedures        MEDICATIONS GIVEN IN ER    Medications - No data to display      PROGRESS, DATA ANALYSIS, CONSULTS, AND MEDICAL DECISION MAKING    All labs have been independently reviewed by me.  All radiology studies have been reviewed by me.   EKG's independently reviewed by me.  Discussion below represents my analysis of pertinent findings related to patient's condition, differential diagnosis, treatment plan and final disposition.    DIFFERENTIAL DIAGNOSIS INCLUDE BUT NOT LIMITED TO: [place], ACS, aortic dissection, costochondritis, pericarditis, endocarditis, pneumonia, acute bronchitis, pneumothorax, PE, viral syndrome, pancreatitis, biliary colic, cholecystitis, GERD, muscle spasm, anxiety       ED Course as of May 25 1920   Tue May 25, 2021   1130 Pt refused chest xray     [JS]   1420 I talked with Dr. Oconnell and Maira the nurse.  The patient appeared to have a panic attack upstairs.  And they sent him down here.  Since he has been down here for 4 hours and 47 minutes.  His heart rate is been in the 50s to 60s and normal sinus rhythm.  He has had no bouts of tachycardia.  His O2 sats been 100% on room air and his blood pressures been normal.  There is times where he will fall asleep.  In talking with the cardiologist he wants him to stop the Tambocor but continue the  beta-blocker and Eliquis.  They will arrange follow-up for him.    [MM]   1421 I talked with the patient and spouse that arrived back in the room at length.  Informed the conversation I had with the cardiologist as well as the results of all tests, his cath report, and his lab work and evaluation here.  I believe he has bouts of atrial fibrillation and flutter but I believe he also has likely some panic and anxiety disorders.  He has a long history of anxiety and has been on Xanax in the past.  Informed him and his spouse that he needs to follow-up with his primary care doctor and potential psychiatrist for his anxiety.  All questions answered.    [MM]   1919 proBNP: 154.4 [JS]   1920 WBC: 3.86 [JS]   1920 Hemoglobin: 14.2 [JS]   1920 Troponin T: <0.010 [JS]   1920 Magnesium: 2.1 [JS]      ED Course User Index  [JS] Angely Blair APRN  [MM] Andrew Serna MD       AS OF 19:20 EDT VITALS:        BP - 118/71  HR - 54  TEMP - 97.3 °F (36.3 °C) (Tympanic)  O2 SATS - 94%         Medication List      Stop    flecainide 50 MG tablet  Commonly known as: TAMBOCOR              DIAGNOSIS  Final diagnoses:   Palpitations         DISPOSITION  DIscharge     Pt masked in first look. I wore appropriate PPE throughout my encounters with the pt. I performed hand hygiene on entry into the pt room and upon exit.     Dictated utilizing Dragon dictation:  Much of this encounter note is an electronic transcription/translation of spoken language to printed text. The electronic translation of spoken language may permit erroneous, or at times, nonsensical words or phrases to be inadvertently transcribed; Although I have reviewed the note for such errors, some may still exist.     Angely Blair APRN  05/25/21 1920

## 2021-05-29 PROBLEM — G47.31 CSA (CENTRAL SLEEP APNEA): Status: ACTIVE | Noted: 2021-05-29

## 2021-05-29 PROBLEM — F51.04 PSYCHOPHYSIOLOGICAL INSOMNIA: Status: ACTIVE | Noted: 2021-05-29

## 2021-05-29 PROBLEM — R29.818 SUSPECTED SLEEP APNEA: Status: RESOLVED | Noted: 2020-12-04 | Resolved: 2021-05-29

## 2021-07-01 RX ORDER — METOPROLOL SUCCINATE 25 MG/1
TABLET, EXTENDED RELEASE ORAL
Qty: 45 TABLET | Refills: 1 | Status: SHIPPED | OUTPATIENT
Start: 2021-07-01 | End: 2022-06-24

## 2021-08-12 ENCOUNTER — OFFICE VISIT (OUTPATIENT)
Dept: SLEEP MEDICINE | Facility: HOSPITAL | Age: 56
End: 2021-08-12

## 2021-08-12 VITALS — BODY MASS INDEX: 37.77 KG/M2 | OXYGEN SATURATION: 98 % | HEIGHT: 73 IN | WEIGHT: 285 LBS | HEART RATE: 66 BPM

## 2021-08-12 DIAGNOSIS — G47.33 OSA (OBSTRUCTIVE SLEEP APNEA): Primary | ICD-10-CM

## 2021-08-12 PROCEDURE — 99214 OFFICE O/P EST MOD 30 MIN: CPT | Performed by: INTERNAL MEDICINE

## 2021-08-12 PROCEDURE — G0463 HOSPITAL OUTPT CLINIC VISIT: HCPCS

## 2021-08-12 RX ORDER — BUSPIRONE HYDROCHLORIDE 7.5 MG/1
TABLET ORAL
Qty: 14 TABLET | Refills: 0 | Status: SHIPPED | OUTPATIENT
Start: 2021-08-12 | End: 2021-11-02

## 2021-08-12 NOTE — PROGRESS NOTES
"[facility] Note     Chief Complaint:  TADEO     Primary Care Physician: Terrance Jasso MD    Interval History:   The patient is a 56 y.o. male  who I last saw 5/19/2021 and that note was reviewed.  The patient is deaf and we communicated by lipreading and note writing.  The patient has not used his auto CPAP since 6-21.  The patient has moderate obstructive sleep apnea by home sleep study 3/4/2021.  The patient states his anxiety gets really bad and he has difficulty using auto CPAP.  Even though not using auto CPAP, the patient takes Ambien CR 12.5 at 11 PM and hydrocodone at 9 PM.  He will have 2 beers around 4 or 5 PM.  The patient goes to bed at 11 PM and awakens at 4 AM.  He has to use the restroom.    Self-administered [place] Sleepiness Scale test results: 0  0-5 Lower normal daytime sleepiness  6-10 Higher normal daytime sleepiness  11-12 Mild, 13-15 Moderate, & 16-24 Severe excessive daytime sleepiness    Review of Systems:    A complete review of systems was done and all were negative with the exception of shortness of breath with exertion, atypical chest pain, and anxiety and depression    Social History:    Social History     Socioeconomic History   • Marital status: Single     Spouse name: Not on file   • Number of children: Not on file   • Years of education: Not on file   • Highest education level: Not on file   Tobacco Use   • Smoking status: Never Smoker   • Smokeless tobacco: Never Used   • Tobacco comment: No caffeine   Vaping Use   • Vaping Use: Never used   Substance and Sexual Activity   • Alcohol use: Yes     Alcohol/week: 12.0 standard drinks     Types: 12 Cans of beer per week   • Drug use: Never   • Sexual activity: Yes       Allergies:  Patient has no known allergies.     Medication Review:  Reviewed.      Vital Signs:    Vitals:    08/12/21 0900   Pulse: 66   SpO2: 98%   Weight: 129 kg (285 lb)   Height: 185.4 cm (73\")     Body mass index is 37.6 kg/m².    Physical " Exam:    Constitutional:  Well developed 56 y.o. male that appears in no apparent distress.  Awake & oriented times 3.  Normal mood with normal recent and remote memory and normal judgement.  Eyes:  Conjunctivae normal.  Oropharynx: Previously, moist mucous membranes without exudate and a large tongue and class III Mallampati airway, patient is wearing a facemask.     Downloaded PAP Data Reviewed For Compliance:  DME is [facility] and he uses a nasal mask.  Downloads between 413 and 6-21 compliance 59%.  Average usage is 2 hours and 39 minutes.  Average AHI is mildly abnormal at 13.7 but all subsets are normal except for his partial obstructive index.  Additionally, the patient does have a large leak.  Average auto CPAP pressure is 11.6 and his auto CPAP is 11-20.    I have reviewed the above results and compared them with the patient's last downloads and reviewed with the patient.    Impression:   Moderate obstructive sleep apnea by home sleep study 3/4/2021 adequately treated with auto CPAP.  However, the patient does have a large leak. The patient is not at goal or compliance and usage. The patient has no complaints of hypersomnolence.    Plan:  Good sleep hygiene measures should be maintained.  Weight loss would be beneficial in this patient who is obese by BMI.      After evaluating the patient and assessing results available, the patient is benefiting some from the treatment being provided.     The patient will continue auto CPAP.  After clinical evaluation and review of downloads, I recommend no changes to the patient's pressures.  Not using his auto CPAP increases complications from obstructive sleep apnea.    The patient was warned that any medications that blunts his arousals are dangerous.  That includes hydrocodone and Ambien.    The patient states he is waiting for a psychiatric appointment with [facility] for his anxiety?  In the interim, I would recommend a trial of buspirone 7.5 mg at 9 PM.  Risk and  benefits of this drug described to the patient.  If anxiety can be blunted, increased use of auto CPAP is hoped for.    I answered all of the patient's questions.  The patient will call for any problems and will follow call in 2 weeks for an update.        Aneudy Ford MD  Sleep Medicine  08/14/21  07:49 EDT

## 2021-08-30 NOTE — PROGRESS NOTES
Date of Office Visit: 2021  Encounter Provider: Luther Oconnell MD  Place of Service: [facility] CARDIOLOGY  Patient Name: Aris Cameron  :1965    Chief Complaint   Patient presents with   • paroxysmal atrial flutter   • Rapid Heart Rate   • Coronary Artery Disease   • Sleep Apnea   • Hypertension   :     HPI: Aris Cameron is a 56 y.o. male who presents today for atrial fibrillation    He had a recent ablation for atrial flutter by Dr. Le.  He was having recurrent palpitations today and presented for urgent evaluation, but was extremely anxious / complaining of neurologic symptoms.  He would not let me evaluate him.   I referred him to the ER for further evaluation.           Past Medical History:   Diagnosis Date   • Alcohol abuse, daily use    • Anxiety    • Aortic root dilation (CMS/HCC)    • Ascending aorta dilation (CMS/HCC)    • Atrial fibrillation with RVR (CMS/HCC)    • Atrial flutter with rapid ventricular response (CMS/HCC)    • CAD (coronary artery disease)    • Chest pain    • Deaf    • Elevated LFTs    • GERD (gastroesophageal reflux disease)    • HTN (hypertension)    • Hyperlipidemia    • Insomnia    • Musculoskeletal chest pain    • Neck pain    • Obesity    • TADEO (obstructive sleep apnea) 2021    Home sleep study.  Weight 287 pounds.  Moderate TADEO with AHI 25 events per hour.  No sleep-related hypoxia.  The patient snored 4% of the total monitoring time.   • TADEO on CPAP    • PAF (paroxysmal atrial fibrillation) (CMS/HCC)    • Palpitations        Past Surgical History:   Procedure Laterality Date   • CARDIAC CATHETERIZATION N/A 2020    Procedure: Coronary angiography;  Surgeon: Geri Moya MD;  Location: [facility] CATH INVASIVE LOCATION;  Service: Cardiovascular;  Laterality: N/A;   • CARDIAC CATHETERIZATION N/A 2020    Procedure: Left heart cath;  Surgeon: Geri Moya MD;  Location: [facility] CATH INVASIVE LOCATION;   Service: Cardiovascular;  Laterality: N/A;   • CARDIAC CATHETERIZATION N/A 2020    Procedure: Left ventriculography;  Surgeon: Geri Moya MD;  Location: [facility] CATH INVASIVE LOCATION;  Service: Cardiovascular;  Laterality: N/A;   • CARDIAC ELECTROPHYSIOLOGY PROCEDURE N/A 2021    Procedure: Ablation atrial flutter;  Surgeon: Terrance Le MD;  Location:  [facility] CATH INVASIVE LOCATION;  Service: Cardiovascular;  Laterality: N/A;   • HAND SURGERY     • INNER EAR SURGERY         Social History     Socioeconomic History   • Marital status: Single     Spouse name: Not on file   • Number of children: Not on file   • Years of education: Not on file   • Highest education level: Not on file   Tobacco Use   • Smoking status: Never Smoker   • Smokeless tobacco: Never Used   • Tobacco comment: No caffeine   Vaping Use   • Vaping Use: Never used   Substance and Sexual Activity   • Alcohol use: Yes     Alcohol/week: 12.0 standard drinks     Types: 12 Cans of beer per week   • Drug use: Never   • Sexual activity: Yes       Family History   Problem Relation Age of Onset   • Coronary artery disease Mother         Mother with fatal MI at age 59   • Coronary artery disease Father         Father with MI at age 62   • Prostate cancer Father 59        malignant tumor of prostate  62   • Heart disease Brother 57        Brother with fatal MI at age 57       ROS    No Known Allergies      Current Outpatient Medications:   •  apixaban (ELIQUIS) 5 MG tablet tablet, Take 1 tablet by mouth 2 (Two) Times a Day., Disp: 60 tablet, Rfl: 1  •  busPIRone (BUSPAR) 7.5 MG tablet, Take 1 tablet prior to bedtime for anxiety, Disp: 14 tablet, Rfl: 0  •  HYDROcodone-acetaminophen (NORCO) 7.5-325 MG per tablet, Take 1 tablet by mouth., Disp: , Rfl:   •  metoprolol succinate XL (TOPROL-XL) 25 MG 24 hr tablet, TAKE 1/2 TABLET BY MOUTH EVERY DAY FOR 30 DAYS, Disp: 45 tablet, Rfl: 1  •  zolpidem CR (AMBIEN CR) 12.5 MG CR tablet, Take 12.5 mg  "by mouth every night at bedtime., Disp: , Rfl:       Objective:     Vitals:    05/25/21 0918   Height: 185.4 cm (73\")     Body mass index is 37.6 kg/m².    PHYSICAL EXAM:    Physical Exam        ECG 12 Lead    Date/Time: 5/25/2021 10:06 PM  Performed by: Luther Oconnell MD  Authorized by: Luther Oconnell MD   Comparison: not compared with previous ECG   Rhythm: sinus rhythm              Assessment:       Diagnosis Plan   1. Atrial fibrillation with RVR (CMS/HCC)            Plan:       Referred to the emergency room.     As always, it has been a pleasure to participate in your patient's care.      Sincerely,         Luther Oconnell MD    "

## 2021-09-02 ENCOUNTER — OFFICE VISIT (OUTPATIENT)
Dept: CARDIOLOGY | Facility: CLINIC | Age: 56
End: 2021-09-02

## 2021-09-02 VITALS
HEART RATE: 158 BPM | SYSTOLIC BLOOD PRESSURE: 146 MMHG | HEIGHT: 73 IN | WEIGHT: 279 LBS | DIASTOLIC BLOOD PRESSURE: 90 MMHG | BODY MASS INDEX: 36.98 KG/M2

## 2021-09-02 DIAGNOSIS — I48.92 ATRIAL FLUTTER WITH RAPID VENTRICULAR RESPONSE (HCC): Primary | ICD-10-CM

## 2021-09-02 DIAGNOSIS — Z98.890 H/O CARDIAC RADIOFREQUENCY ABLATION: ICD-10-CM

## 2021-09-02 DIAGNOSIS — I48.91 ATRIAL FIBRILLATION WITH RVR (HCC): ICD-10-CM

## 2021-09-02 PROCEDURE — 99214 OFFICE O/P EST MOD 30 MIN: CPT | Performed by: NURSE PRACTITIONER

## 2021-09-02 PROCEDURE — 93000 ELECTROCARDIOGRAM COMPLETE: CPT | Performed by: NURSE PRACTITIONER

## 2021-09-02 NOTE — PROGRESS NOTES
Date of Office Visit: 2021  Encounter Provider: NGOC Neri  Place of Service: [facility] CARDIOLOGY  Patient Name: Aris Cameron  :1965    Chief Complaint   Patient presents with   • AFIB w/ RVR   :     HPI: Aris Cameron is a 56 y.o. male who had followed with Dr. Falcon, he first saw him in 2020, he was then seen in the [place] [facility] clinic a few weeks later.  He had an echo in 2020 which showed normal LV systolic function, RV appeared enlarged with decreased RV systolic function no significant valvular abnormalities and mild aortic root dilatation.  He then saw NGOC Walters in 2020 had a follow-up nuclear stress test which showed no ischemia and normal EF but apparently was having chest pain so he ended up undergoing a cath on  which showed normal coronaries.    Presented to the ED in May 2021 with complaints of chest pain, he was found to be in atrial flutter with rapid ventricular response, he had negative troponins, Dr. Le and I saw him during that admission and he did end up undergoing his CTI dependent ablation.    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  He also has paralyzing anxiety which is a major problem for him as he has not really kept his follow-up appointments. He ended up going to the ED twice in May after his ablation for palpitations---ED here at [facility], he was in SR---ED at [place] ---EKG's showed aflutter/afib, treated with IV dilt and spontaneously converted to SR.     He has not followed up with anyone since that time but finally came to office today.     He reports that he has had dyspnea and intermittent palpitations and heart racing since the ablation.  He has a hard time deciding whether this is caused by his severe anxiety or if it is really something wrong with his heart.    He does  check his pulse and does not think it is fast all the time.     When I ask him why he has not called and come in sooner he said he has just not been able to-- it makes him too anxious.    He does not have chest pain but he does have shortness of breath at times with exertion, no PND, orthopnea or edema.    He is on apixaban and metoprolol and says he takes these faithfully he does not miss any doses.        Past Medical History:   Diagnosis Date   • Alcohol abuse, daily use    • Anxiety    • Aortic root dilation (CMS/HCC)    • Ascending aorta dilation (CMS/HCC)    • Atrial fibrillation with RVR (CMS/HCC)    • Atrial flutter with rapid ventricular response (CMS/HCC)    • CAD (coronary artery disease)    • Chest pain    • Deaf    • Elevated LFTs    • GERD (gastroesophageal reflux disease)    • HTN (hypertension)    • Hyperlipidemia    • Insomnia    • Musculoskeletal chest pain    • Neck pain    • Obesity    • TADEO (obstructive sleep apnea) 03/04/2021    Home sleep study.  Weight 287 pounds.  Moderate TADEO with AHI 25 events per hour.  No sleep-related hypoxia.  The patient snored 4% of the total monitoring time.   • TADEO on CPAP    • PAF (paroxysmal atrial fibrillation) (CMS/HCC)    • Palpitations        Past Surgical History:   Procedure Laterality Date   • CARDIAC CATHETERIZATION N/A 12/17/2020    Procedure: Coronary angiography;  Surgeon: Geri Moya MD;  Location: [facility] INVASIVE LOCATION;  Service: Cardiovascular;  Laterality: N/A;   • CARDIAC CATHETERIZATION N/A 12/17/2020    Procedure: Left heart cath;  Surgeon: Geri Moya MD;  Location: [facility] CATH INVASIVE LOCATION;  Service: Cardiovascular;  Laterality: N/A;   • CARDIAC CATHETERIZATION N/A 12/17/2020    Procedure: Left ventriculography;  Surgeon: Geri Moya MD;  Location: [facility] CATH INVASIVE LOCATION;  Service: Cardiovascular;  Laterality: N/A;   • CARDIAC ELECTROPHYSIOLOGY PROCEDURE N/A 5/4/2021    Procedure: Ablation atrial flutter;  Surgeon:  Terrance Le MD;  Location: [facility] INVASIVE LOCATION;  Service: Cardiovascular;  Laterality: N/A;   • HAND SURGERY     • INNER EAR SURGERY         Social History     Socioeconomic History   • Marital status: Single     Spouse name: Not on file   • Number of children: Not on file   • Years of education: Not on file   • Highest education level: Not on file   Tobacco Use   • Smoking status: Never Smoker   • Smokeless tobacco: Never Used   • Tobacco comment: No caffeine   Vaping Use   • Vaping Use: Never used   Substance and Sexual Activity   • Alcohol use: Yes     Alcohol/week: 12.0 standard drinks     Types: 12 Cans of beer per week   • Drug use: Never   • Sexual activity: Yes       Family History   Problem Relation Age of Onset   • Coronary artery disease Mother         Mother with fatal MI at age 59   • Coronary artery disease Father         Father with MI at age 62   • Prostate cancer Father 59        malignant tumor of prostate  62   • Heart disease Brother 57        Brother with fatal MI at age 57       Review of Systems   Constitutional: Negative for chills, fever and malaise/fatigue.   Cardiovascular: Positive for dyspnea on exertion and irregular heartbeat. Negative for chest pain, leg swelling, near-syncope, orthopnea, palpitations, paroxysmal nocturnal dyspnea and syncope.   Respiratory: Negative for cough and shortness of breath.    Hematologic/Lymphatic: Negative.    Musculoskeletal: Negative for joint pain, joint swelling and myalgias.   Gastrointestinal: Negative for abdominal pain, diarrhea, melena, nausea and vomiting.   Genitourinary: Negative for frequency and hematuria.   Neurological: Negative for light-headedness, numbness, paresthesias and seizures.   Psychiatric/Behavioral: The patient is nervous/anxious.    Allergic/Immunologic: Negative.    All other systems reviewed and are negative.      No Known Allergies      Current Outpatient Medications:   •  apixaban (ELIQUIS) 5 MG tablet  "tablet, Take 1 tablet by mouth 2 (Two) Times a Day., Disp: 60 tablet, Rfl: 1  •  HYDROcodone-acetaminophen (NORCO) 7.5-325 MG per tablet, Take 1 tablet by mouth., Disp: , Rfl:   •  metoprolol succinate XL (TOPROL-XL) 25 MG 24 hr tablet, TAKE 1/2 TABLET BY MOUTH EVERY DAY FOR 30 DAYS, Disp: 45 tablet, Rfl: 1  •  zolpidem CR (AMBIEN CR) 12.5 MG CR tablet, Take 12.5 mg by mouth every night at bedtime., Disp: , Rfl:   •  busPIRone (BUSPAR) 7.5 MG tablet, Take 1 tablet prior to bedtime for anxiety, Disp: 14 tablet, Rfl: 0      Objective:     Vitals:    09/02/21 0944   BP: 146/90   Pulse: (!) 158   Weight: 127 kg (279 lb)   Height: 185.4 cm (73\")     Body mass index is 36.81 kg/m².    PHYSICAL EXAM:    Vitals Reviewed.   General Appearance: No acute distress, well developed and well nourished.   Eyes: Conjunctiva and lids: No erythema, swelling, or discharge. Sclera non-icteric.   HENT: Atraumatic, normocephalic. External eyes, ears, and nose normal.   Respiratory: No signs of respiratory distress. Respiration rhythm and depth normal.   Clear to auscultation. No rales, crackles, rhonchi, or wheezing auscultated.   Cardiovascular:  Heart Rate and Rhythm: Regular, tachycardic heart Sounds: S1 and S2.   Murmurs: No murmurs noted. No rubs, thrills, or gallops.   Arterial Pulses:  Posterior tibialis and dorsalis pedis pulses normal.   Lower Extremities: No edema noted.  Gastrointestinal:  Abdomen soft, non-distended, non-tender.   Musculoskeletal: Normal movement of extremities  Skin and Nails: General appearance normal. No pallor, cyanosis, diaphoresis. Skin temperature normal. No clubbing of fingernails.   Psychiatric: Patient alert and oriented to person, place, and time. Speech and behavior appropriate. Normal mood and affect.       ECG 12 Lead    Date/Time: 9/2/2021 4:22 PM  Performed by: Margaret Escobar APRN  Authorized by: Escobar, Margaret A, APRN   Comparison: compared with previous ECG   Comparison to previous ECG: Atrial " "flutter replaced SR  Rhythm: atrial flutter  BPM: 158                Assessment:       Diagnosis Plan   1. Atrial flutter with rapid ventricular response (CMS/HCC)     2. Atrial fibrillation with RVR (CMS/HCC)     3. H/O cardiac radiofrequency ablation--CTI 5/2021            Plan:       Difficult situation because he truly has paralyzing anxiety---he knows he needs help and has see PCP and someone has also prescribed him Buspar but he won't take it until he sees PCP and he okays it--he is too scared.     Also difficult because he is deaf---he reads lips really well and written communication works fine also.     I could barely get him to stay in the office while I went to talk to Dr. Le---I told him he needed a repeat procedure---this made him extremely anxious---he said \"in a few months---I need time to get my mind right to deal with this.\"     I remember when he was in the hospital in May---he almost left AMA.    I explained that I don't think he can wait that long---it is unclear whether he is in sustained atrial flutter although he says he checks his pulse and it is not always that fast, sometimes 90 or less.     He does not have much of a support system---Darcy--his ex-wife/---does help him--she is really the only person he has to help him.     I spoke to Dr. Le--he agrees needs redo ablation sooner rather than later---probably recurrent typical flutter but could be atypical---he also has afib so PVI would be recommended as well.    He said I could call Darcy and talk with her about what was going on, that we would have to try and schedule around her schedule. I tried to call a couple of times after he left the office no answer and voicemail full, will try again.     He said we can communicate with him by texting him. Will try again tomorrow.    He was not in heart failure/volume overloaded today which makes me think he probably goes in and out of AF---I told him to just take it easy for now " ---would like to get him scheduled next week, however there is a real possibility that he will not show up.     As always, it has been a pleasure to participate in your patient's care.      Sincerely,         NGOC Garcia

## 2021-09-03 ENCOUNTER — TELEPHONE (OUTPATIENT)
Dept: CARDIOLOGY | Facility: CLINIC | Age: 56
End: 2021-09-03

## 2021-09-03 ENCOUNTER — TRANSCRIBE ORDERS (OUTPATIENT)
Dept: CARDIOLOGY | Facility: CLINIC | Age: 56
End: 2021-09-03

## 2021-09-03 ENCOUNTER — HOSPITAL ENCOUNTER (OUTPATIENT)
Facility: HOSPITAL | Age: 56
Setting detail: HOSPITAL OUTPATIENT SURGERY
End: 2021-09-03
Attending: INTERNAL MEDICINE | Admitting: INTERNAL MEDICINE

## 2021-09-03 DIAGNOSIS — Z01.810 PREPROCEDURAL CARDIOVASCULAR EXAMINATION: ICD-10-CM

## 2021-09-03 DIAGNOSIS — I48.91 ATRIAL FIBRILLATION WITH RVR (HCC): ICD-10-CM

## 2021-09-03 DIAGNOSIS — Z13.6 SCREENING FOR CARDIOVASCULAR CONDITION: Primary | ICD-10-CM

## 2021-09-03 DIAGNOSIS — I48.92 ATRIAL FLUTTER WITH RAPID VENTRICULAR RESPONSE (HCC): ICD-10-CM

## 2021-09-03 DIAGNOSIS — I48.92 ATRIAL FLUTTER WITH RAPID VENTRICULAR RESPONSE (HCC): Primary | ICD-10-CM

## 2021-09-03 DIAGNOSIS — Z01.818 OTHER SPECIFIED PRE-OPERATIVE EXAMINATION: Primary | ICD-10-CM

## 2021-09-03 NOTE — TELEPHONE ENCOUNTER
I was able to get a hold of Darcy today---they have a child together and she is really the only person who helps him and that he will listen too    Explained situation and that he needs ablation for afib and atrial flutter---he ask that we try to schedule according to her schedule --Tuesdays work better for her---will try to add him on 9/7 since he was in AFlutter 's yesterday.     He assured me that he was taking his apixaban and has not missed any doses.

## 2021-09-04 ENCOUNTER — LAB (OUTPATIENT)
Dept: LAB | Facility: HOSPITAL | Age: 56
End: 2021-09-04

## 2021-09-04 DIAGNOSIS — Z01.818 OTHER SPECIFIED PRE-OPERATIVE EXAMINATION: ICD-10-CM

## 2021-09-04 LAB — SARS-COV-2 ORF1AB RESP QL NAA+PROBE: NOT DETECTED

## 2021-09-04 PROCEDURE — U0004 COV-19 TEST NON-CDC HGH THRU: HCPCS

## 2021-09-04 PROCEDURE — C9803 HOPD COVID-19 SPEC COLLECT: HCPCS

## 2021-09-16 ENCOUNTER — TRANSCRIBE ORDERS (OUTPATIENT)
Dept: CARDIOLOGY | Facility: CLINIC | Age: 56
End: 2021-09-16

## 2021-09-16 DIAGNOSIS — Z01.818 OTHER SPECIFIED PRE-OPERATIVE EXAMINATION: Primary | ICD-10-CM

## 2021-09-16 DIAGNOSIS — I48.91 ATRIAL FIBRILLATION WITH RVR (HCC): Primary | ICD-10-CM

## 2021-09-16 NOTE — TELEPHONE ENCOUNTER
Patient was scheduled for atrial fibrillation ablation 9/7, however after all accommodations were specifically made he did not show for procedure.  Patient has left a few messages to get put back on the scheduled.  Please place new order if this is appropriate to proceed.    Thank you    Nadeem SANDERSON

## 2021-09-16 NOTE — TELEPHONE ENCOUNTER
I have put another order in.  I have copied Mirian to see if we can leave orders in when patients do not show up for a couple of weeks afterwards.

## 2021-09-28 ENCOUNTER — LAB (OUTPATIENT)
Dept: LAB | Facility: HOSPITAL | Age: 56
End: 2021-09-28

## 2021-09-28 DIAGNOSIS — Z01.818 OTHER SPECIFIED PRE-OPERATIVE EXAMINATION: ICD-10-CM

## 2021-09-28 DIAGNOSIS — Z01.810 PREPROCEDURAL CARDIOVASCULAR EXAMINATION: ICD-10-CM

## 2021-09-28 DIAGNOSIS — Z13.6 SCREENING FOR CARDIOVASCULAR CONDITION: ICD-10-CM

## 2021-09-28 LAB
ANION GAP SERPL CALCULATED.3IONS-SCNC: 13.6 MMOL/L (ref 5–15)
BASOPHILS # BLD AUTO: 0.04 10*3/MM3 (ref 0–0.2)
BASOPHILS NFR BLD AUTO: 0.9 % (ref 0–1.5)
BUN SERPL-MCNC: 10 MG/DL (ref 6–20)
BUN/CREAT SERPL: 10.8 (ref 7–25)
CALCIUM SPEC-SCNC: 10 MG/DL (ref 8.6–10.5)
CHLORIDE SERPL-SCNC: 103 MMOL/L (ref 98–107)
CO2 SERPL-SCNC: 22.4 MMOL/L (ref 22–29)
CREAT SERPL-MCNC: 0.93 MG/DL (ref 0.76–1.27)
DEPRECATED RDW RBC AUTO: 48 FL (ref 37–54)
EOSINOPHIL # BLD AUTO: 0.1 10*3/MM3 (ref 0–0.4)
EOSINOPHIL NFR BLD AUTO: 2.2 % (ref 0.3–6.2)
ERYTHROCYTE [DISTWIDTH] IN BLOOD BY AUTOMATED COUNT: 13.8 % (ref 12.3–15.4)
GFR SERPL CREATININE-BSD FRML MDRD: 85 ML/MIN/1.73
GLUCOSE SERPL-MCNC: 96 MG/DL (ref 65–99)
HCT VFR BLD AUTO: 47.2 % (ref 37.5–51)
HGB BLD-MCNC: 16.2 G/DL (ref 13–17.7)
IMM GRANULOCYTES # BLD AUTO: 0.06 10*3/MM3 (ref 0–0.05)
IMM GRANULOCYTES NFR BLD AUTO: 1.3 % (ref 0–0.5)
LYMPHOCYTES # BLD AUTO: 1.9 10*3/MM3 (ref 0.7–3.1)
LYMPHOCYTES NFR BLD AUTO: 41 % (ref 19.6–45.3)
MCH RBC QN AUTO: 32.5 PG (ref 26.6–33)
MCHC RBC AUTO-ENTMCNC: 34.3 G/DL (ref 31.5–35.7)
MCV RBC AUTO: 94.6 FL (ref 79–97)
MONOCYTES # BLD AUTO: 0.5 10*3/MM3 (ref 0.1–0.9)
MONOCYTES NFR BLD AUTO: 10.8 % (ref 5–12)
NEUTROPHILS NFR BLD AUTO: 2.03 10*3/MM3 (ref 1.7–7)
NEUTROPHILS NFR BLD AUTO: 43.8 % (ref 42.7–76)
NRBC BLD AUTO-RTO: 0.2 /100 WBC (ref 0–0.2)
PLATELET # BLD AUTO: 236 10*3/MM3 (ref 140–450)
PMV BLD AUTO: 9.2 FL (ref 6–12)
POTASSIUM SERPL-SCNC: 4 MMOL/L (ref 3.5–5.2)
RBC # BLD AUTO: 4.99 10*6/MM3 (ref 4.14–5.8)
SARS-COV-2 ORF1AB RESP QL NAA+PROBE: NOT DETECTED
SODIUM SERPL-SCNC: 139 MMOL/L (ref 136–145)
WBC # BLD AUTO: 4.63 10*3/MM3 (ref 3.4–10.8)

## 2021-09-28 PROCEDURE — U0004 COV-19 TEST NON-CDC HGH THRU: HCPCS

## 2021-09-28 PROCEDURE — 80048 BASIC METABOLIC PNL TOTAL CA: CPT

## 2021-09-28 PROCEDURE — 36415 COLL VENOUS BLD VENIPUNCTURE: CPT

## 2021-09-28 PROCEDURE — 85025 COMPLETE CBC W/AUTO DIFF WBC: CPT

## 2021-09-28 PROCEDURE — C9803 HOPD COVID-19 SPEC COLLECT: HCPCS

## 2021-09-30 ENCOUNTER — ANESTHESIA EVENT (OUTPATIENT)
Dept: CARDIOLOGY | Facility: HOSPITAL | Age: 56
End: 2021-09-30

## 2021-09-30 ENCOUNTER — HOSPITAL ENCOUNTER (OUTPATIENT)
Facility: HOSPITAL | Age: 56
Discharge: HOME | End: 2021-10-01
Attending: INTERNAL MEDICINE | Admitting: INTERNAL MEDICINE

## 2021-09-30 ENCOUNTER — ANESTHESIA (OUTPATIENT)
Dept: CARDIOLOGY | Facility: HOSPITAL | Age: 56
End: 2021-09-30

## 2021-09-30 DIAGNOSIS — I48.91 ATRIAL FIBRILLATION WITH RVR (HCC): ICD-10-CM

## 2021-09-30 LAB
ACT BLD: 224 SECONDS (ref 82–152)
ACT BLD: 268 SECONDS (ref 82–152)
ACT BLD: 279 SECONDS (ref 82–152)
ACT BLD: 296 SECONDS (ref 82–152)
ACT BLD: 307 SECONDS (ref 82–152)
QT INTERVAL: 384 MS
QT INTERVAL: 393 MS

## 2021-09-30 PROCEDURE — 93613 INTRACARDIAC EPHYS 3D MAPG: CPT | Performed by: INTERNAL MEDICINE

## 2021-09-30 PROCEDURE — A9270 NON-COVERED ITEM OR SERVICE: HCPCS | Performed by: NURSE PRACTITIONER

## 2021-09-30 PROCEDURE — 25010000002 DIPHENHYDRAMINE PER 50 MG: Performed by: NURSE ANESTHETIST, CERTIFIED REGISTERED

## 2021-09-30 PROCEDURE — 25010000002 SUCCINYLCHOLINE PER 20 MG: Performed by: NURSE ANESTHETIST, CERTIFIED REGISTERED

## 2021-09-30 PROCEDURE — C1732 CATH, EP, DIAG/ABL, 3D/VECT: HCPCS | Performed by: INTERNAL MEDICINE

## 2021-09-30 PROCEDURE — C1731 CATH, EP, 20 OR MORE ELEC: HCPCS | Performed by: INTERNAL MEDICINE

## 2021-09-30 PROCEDURE — 93656 COMPRE EP EVAL ABLTJ ATR FIB: CPT | Performed by: INTERNAL MEDICINE

## 2021-09-30 PROCEDURE — 25010000002 DEXAMETHASONE PER 1 MG: Performed by: NURSE ANESTHETIST, CERTIFIED REGISTERED

## 2021-09-30 PROCEDURE — 63710000001 CLONAZEPAM 0.5 MG TABLET: Performed by: NURSE PRACTITIONER

## 2021-09-30 PROCEDURE — C1730 CATH, EP, 19 OR FEW ELECT: HCPCS | Performed by: INTERNAL MEDICINE

## 2021-09-30 PROCEDURE — C1893 INTRO/SHEATH, FIXED,NON-PEEL: HCPCS | Performed by: INTERNAL MEDICINE

## 2021-09-30 PROCEDURE — 93655 ICAR CATH ABLTJ DSCRT ARRHYT: CPT | Performed by: INTERNAL MEDICINE

## 2021-09-30 PROCEDURE — 25010000002 HEPARIN (PORCINE) PER 1000 UNITS: Performed by: INTERNAL MEDICINE

## 2021-09-30 PROCEDURE — 93005 ELECTROCARDIOGRAM TRACING: CPT | Performed by: INTERNAL MEDICINE

## 2021-09-30 PROCEDURE — 25010000002 PROPOFOL 10 MG/ML EMULSION: Performed by: NURSE ANESTHETIST, CERTIFIED REGISTERED

## 2021-09-30 PROCEDURE — 25010000002 PROTAMINE SULFATE PER 10 MG: Performed by: INTERNAL MEDICINE

## 2021-09-30 PROCEDURE — 63710000001 APIXABAN 5 MG TABLET: Performed by: NURSE PRACTITIONER

## 2021-09-30 PROCEDURE — 63710000001 HYDROCODONE-ACETAMINOPHEN 7.5-325 MG TABLET: Performed by: NURSE PRACTITIONER

## 2021-09-30 PROCEDURE — 25010000002 MIDAZOLAM PER 1 MG: Performed by: ANESTHESIOLOGY

## 2021-09-30 PROCEDURE — 93005 ELECTROCARDIOGRAM TRACING: CPT | Performed by: NURSE PRACTITIONER

## 2021-09-30 PROCEDURE — 25010000002 FENTANYL CITRATE (PF) 50 MCG/ML SOLUTION: Performed by: NURSE ANESTHETIST, CERTIFIED REGISTERED

## 2021-09-30 PROCEDURE — 85347 COAGULATION TIME ACTIVATED: CPT

## 2021-09-30 PROCEDURE — 93662 INTRACARDIAC ECG (ICE): CPT | Performed by: INTERNAL MEDICINE

## 2021-09-30 PROCEDURE — C1766 INTRO/SHEATH,STRBLE,NON-PEEL: HCPCS | Performed by: INTERNAL MEDICINE

## 2021-09-30 PROCEDURE — G0378 HOSPITAL OBSERVATION PER HR: HCPCS

## 2021-09-30 PROCEDURE — C1894 INTRO/SHEATH, NON-LASER: HCPCS | Performed by: INTERNAL MEDICINE

## 2021-09-30 PROCEDURE — 25010000002 PHENYLEPHRINE 10 MG/ML SOLUTION 5 ML VIAL: Performed by: NURSE ANESTHETIST, CERTIFIED REGISTERED

## 2021-09-30 PROCEDURE — 63710000001 BUSPIRONE 15 MG TABLET: Performed by: NURSE PRACTITIONER

## 2021-09-30 PROCEDURE — 25010000002 ONDANSETRON PER 1 MG: Performed by: NURSE ANESTHETIST, CERTIFIED REGISTERED

## 2021-09-30 PROCEDURE — 25010000002 ADENOSINE PER 6 MG: Performed by: INTERNAL MEDICINE

## 2021-09-30 PROCEDURE — 25010000002 PHENYLEPHRINE 10 MG/ML SOLUTION: Performed by: NURSE ANESTHETIST, CERTIFIED REGISTERED

## 2021-09-30 PROCEDURE — C1759 CATH, INTRA ECHOCARDIOGRAPHY: HCPCS | Performed by: INTERNAL MEDICINE

## 2021-09-30 PROCEDURE — 25010000002 HYDROMORPHONE PER 4 MG: Performed by: NURSE PRACTITIONER

## 2021-09-30 RX ORDER — SODIUM CHLORIDE 9 MG/ML
75 INJECTION, SOLUTION INTRAVENOUS CONTINUOUS
Status: DISCONTINUED | OUTPATIENT
Start: 2021-09-30 | End: 2021-10-01 | Stop reason: HOSPADM

## 2021-09-30 RX ORDER — HYDRALAZINE HYDROCHLORIDE 20 MG/ML
5 INJECTION INTRAMUSCULAR; INTRAVENOUS
Status: DISCONTINUED | OUTPATIENT
Start: 2021-09-30 | End: 2021-09-30 | Stop reason: HOSPADM

## 2021-09-30 RX ORDER — NALOXONE HCL 0.4 MG/ML
0.4 VIAL (ML) INJECTION
Status: DISCONTINUED | OUTPATIENT
Start: 2021-09-30 | End: 2021-10-01 | Stop reason: HOSPADM

## 2021-09-30 RX ORDER — ONDANSETRON 2 MG/ML
4 INJECTION INTRAMUSCULAR; INTRAVENOUS ONCE AS NEEDED
Status: DISCONTINUED | OUTPATIENT
Start: 2021-09-30 | End: 2021-09-30 | Stop reason: HOSPADM

## 2021-09-30 RX ORDER — EPHEDRINE SULFATE 50 MG/ML
INJECTION, SOLUTION INTRAVENOUS AS NEEDED
Status: DISCONTINUED | OUTPATIENT
Start: 2021-09-30 | End: 2021-09-30 | Stop reason: SURG

## 2021-09-30 RX ORDER — SODIUM CHLORIDE 0.9 % (FLUSH) 0.9 %
10 SYRINGE (ML) INJECTION AS NEEDED
Status: DISCONTINUED | OUTPATIENT
Start: 2021-09-30 | End: 2021-09-30 | Stop reason: HOSPADM

## 2021-09-30 RX ORDER — LIDOCAINE HYDROCHLORIDE 10 MG/ML
0.5 INJECTION, SOLUTION EPIDURAL; INFILTRATION; INTRACAUDAL; PERINEURAL ONCE AS NEEDED
Status: CANCELLED | OUTPATIENT
Start: 2021-09-30

## 2021-09-30 RX ORDER — PROTAMINE SULFATE 10 MG/ML
INJECTION, SOLUTION INTRAVENOUS AS NEEDED
Status: DISCONTINUED | OUTPATIENT
Start: 2021-09-30 | End: 2021-09-30 | Stop reason: HOSPADM

## 2021-09-30 RX ORDER — ROCURONIUM BROMIDE 10 MG/ML
INJECTION, SOLUTION INTRAVENOUS AS NEEDED
Status: DISCONTINUED | OUTPATIENT
Start: 2021-09-30 | End: 2021-09-30 | Stop reason: SURG

## 2021-09-30 RX ORDER — ACETAMINOPHEN 650 MG/1
650 SUPPOSITORY RECTAL EVERY 4 HOURS PRN
Status: DISCONTINUED | OUTPATIENT
Start: 2021-09-30 | End: 2021-10-01 | Stop reason: HOSPADM

## 2021-09-30 RX ORDER — ZOLPIDEM TARTRATE 5 MG/1
5 TABLET ORAL NIGHTLY
Status: DISCONTINUED | OUTPATIENT
Start: 2021-09-30 | End: 2021-10-01 | Stop reason: HOSPADM

## 2021-09-30 RX ORDER — FAMOTIDINE 10 MG/ML
20 INJECTION, SOLUTION INTRAVENOUS ONCE
Status: COMPLETED | OUTPATIENT
Start: 2021-09-30 | End: 2021-09-30

## 2021-09-30 RX ORDER — HYDROMORPHONE HYDROCHLORIDE 1 MG/ML
0.5 INJECTION, SOLUTION INTRAMUSCULAR; INTRAVENOUS; SUBCUTANEOUS
Status: DISCONTINUED | OUTPATIENT
Start: 2021-09-30 | End: 2021-10-01 | Stop reason: HOSPADM

## 2021-09-30 RX ORDER — HYDROCODONE BITARTRATE AND ACETAMINOPHEN 7.5; 325 MG/1; MG/1
1 TABLET ORAL ONCE AS NEEDED
Status: COMPLETED | OUTPATIENT
Start: 2021-09-30 | End: 2021-09-30

## 2021-09-30 RX ORDER — PROPOFOL 10 MG/ML
VIAL (ML) INTRAVENOUS AS NEEDED
Status: DISCONTINUED | OUTPATIENT
Start: 2021-09-30 | End: 2021-09-30 | Stop reason: SURG

## 2021-09-30 RX ORDER — GLYCOPYRROLATE 0.2 MG/ML
INJECTION INTRAMUSCULAR; INTRAVENOUS AS NEEDED
Status: DISCONTINUED | OUTPATIENT
Start: 2021-09-30 | End: 2021-09-30 | Stop reason: SURG

## 2021-09-30 RX ORDER — DIPHENHYDRAMINE HYDROCHLORIDE 50 MG/ML
INJECTION INTRAMUSCULAR; INTRAVENOUS AS NEEDED
Status: DISCONTINUED | OUTPATIENT
Start: 2021-09-30 | End: 2021-09-30 | Stop reason: SURG

## 2021-09-30 RX ORDER — CLONAZEPAM 0.5 MG/1
0.25 TABLET ORAL ONCE
Status: DISCONTINUED | OUTPATIENT
Start: 2021-09-30 | End: 2021-09-30

## 2021-09-30 RX ORDER — ADENOSINE 3 MG/ML
INJECTION, SOLUTION INTRAVENOUS AS NEEDED
Status: DISCONTINUED | OUTPATIENT
Start: 2021-09-30 | End: 2021-09-30 | Stop reason: HOSPADM

## 2021-09-30 RX ORDER — METOPROLOL SUCCINATE 25 MG/1
12.5 TABLET, EXTENDED RELEASE ORAL DAILY
Status: DISCONTINUED | OUTPATIENT
Start: 2021-09-30 | End: 2021-10-01 | Stop reason: HOSPADM

## 2021-09-30 RX ORDER — FENTANYL CITRATE 50 UG/ML
50 INJECTION, SOLUTION INTRAMUSCULAR; INTRAVENOUS
Status: DISCONTINUED | OUTPATIENT
Start: 2021-09-30 | End: 2021-09-30 | Stop reason: HOSPADM

## 2021-09-30 RX ORDER — OXYCODONE AND ACETAMINOPHEN 10; 325 MG/1; MG/1
1 TABLET ORAL EVERY 4 HOURS PRN
Status: DISCONTINUED | OUTPATIENT
Start: 2021-09-30 | End: 2021-09-30 | Stop reason: HOSPADM

## 2021-09-30 RX ORDER — LIDOCAINE HYDROCHLORIDE 20 MG/ML
INJECTION, SOLUTION INFILTRATION; PERINEURAL AS NEEDED
Status: DISCONTINUED | OUTPATIENT
Start: 2021-09-30 | End: 2021-09-30 | Stop reason: SURG

## 2021-09-30 RX ORDER — BUSPIRONE HYDROCHLORIDE 15 MG/1
7.5 TABLET ORAL NIGHTLY
Status: DISCONTINUED | OUTPATIENT
Start: 2021-09-30 | End: 2021-10-01 | Stop reason: HOSPADM

## 2021-09-30 RX ORDER — EPHEDRINE SULFATE 50 MG/ML
5 INJECTION, SOLUTION INTRAVENOUS ONCE AS NEEDED
Status: DISCONTINUED | OUTPATIENT
Start: 2021-09-30 | End: 2021-09-30 | Stop reason: HOSPADM

## 2021-09-30 RX ORDER — SODIUM CHLORIDE 0.9 % (FLUSH) 0.9 %
3 SYRINGE (ML) INJECTION EVERY 12 HOURS SCHEDULED
Status: CANCELLED | OUTPATIENT
Start: 2021-09-30

## 2021-09-30 RX ORDER — PHENYLEPHRINE HYDROCHLORIDE 10 MG/ML
INJECTION INTRAVENOUS AS NEEDED
Status: DISCONTINUED | OUTPATIENT
Start: 2021-09-30 | End: 2021-09-30 | Stop reason: SURG

## 2021-09-30 RX ORDER — DIPHENHYDRAMINE HCL 25 MG
25 CAPSULE ORAL
Status: DISCONTINUED | OUTPATIENT
Start: 2021-09-30 | End: 2021-09-30 | Stop reason: HOSPADM

## 2021-09-30 RX ORDER — DIPHENHYDRAMINE HYDROCHLORIDE 50 MG/ML
12.5 INJECTION INTRAMUSCULAR; INTRAVENOUS
Status: DISCONTINUED | OUTPATIENT
Start: 2021-09-30 | End: 2021-09-30 | Stop reason: HOSPADM

## 2021-09-30 RX ORDER — NITROGLYCERIN 0.4 MG/1
0.4 TABLET SUBLINGUAL
Status: DISCONTINUED | OUTPATIENT
Start: 2021-09-30 | End: 2021-10-01 | Stop reason: HOSPADM

## 2021-09-30 RX ORDER — MIDAZOLAM HYDROCHLORIDE 1 MG/ML
1 INJECTION INTRAMUSCULAR; INTRAVENOUS
Status: COMPLETED | OUTPATIENT
Start: 2021-09-30 | End: 2021-09-30

## 2021-09-30 RX ORDER — LABETALOL HYDROCHLORIDE 5 MG/ML
5 INJECTION, SOLUTION INTRAVENOUS
Status: DISCONTINUED | OUTPATIENT
Start: 2021-09-30 | End: 2021-09-30 | Stop reason: HOSPADM

## 2021-09-30 RX ORDER — HYDROMORPHONE HYDROCHLORIDE 1 MG/ML
0.5 INJECTION, SOLUTION INTRAMUSCULAR; INTRAVENOUS; SUBCUTANEOUS
Status: DISCONTINUED | OUTPATIENT
Start: 2021-09-30 | End: 2021-09-30 | Stop reason: HOSPADM

## 2021-09-30 RX ORDER — DEXAMETHASONE SODIUM PHOSPHATE 10 MG/ML
INJECTION INTRAMUSCULAR; INTRAVENOUS AS NEEDED
Status: DISCONTINUED | OUTPATIENT
Start: 2021-09-30 | End: 2021-09-30 | Stop reason: SURG

## 2021-09-30 RX ORDER — NALOXONE HCL 0.4 MG/ML
0.2 VIAL (ML) INJECTION AS NEEDED
Status: DISCONTINUED | OUTPATIENT
Start: 2021-09-30 | End: 2021-09-30 | Stop reason: HOSPADM

## 2021-09-30 RX ORDER — SODIUM CHLORIDE, SODIUM LACTATE, POTASSIUM CHLORIDE, CALCIUM CHLORIDE 600; 310; 30; 20 MG/100ML; MG/100ML; MG/100ML; MG/100ML
100 INJECTION, SOLUTION INTRAVENOUS CONTINUOUS
Status: DISCONTINUED | OUTPATIENT
Start: 2021-09-30 | End: 2021-10-01 | Stop reason: HOSPADM

## 2021-09-30 RX ORDER — HYDROCODONE BITARTRATE AND ACETAMINOPHEN 7.5; 325 MG/1; MG/1
1 TABLET ORAL EVERY 6 HOURS PRN
Status: DISCONTINUED | OUTPATIENT
Start: 2021-09-30 | End: 2021-10-01 | Stop reason: HOSPADM

## 2021-09-30 RX ORDER — SUCCINYLCHOLINE CHLORIDE 20 MG/ML
INJECTION INTRAMUSCULAR; INTRAVENOUS AS NEEDED
Status: DISCONTINUED | OUTPATIENT
Start: 2021-09-30 | End: 2021-09-30 | Stop reason: SURG

## 2021-09-30 RX ORDER — HEPARIN SODIUM 1000 [USP'U]/ML
INJECTION, SOLUTION INTRAVENOUS; SUBCUTANEOUS AS NEEDED
Status: DISCONTINUED | OUTPATIENT
Start: 2021-09-30 | End: 2021-09-30 | Stop reason: HOSPADM

## 2021-09-30 RX ORDER — SODIUM CHLORIDE, SODIUM LACTATE, POTASSIUM CHLORIDE, CALCIUM CHLORIDE 600; 310; 30; 20 MG/100ML; MG/100ML; MG/100ML; MG/100ML
9 INJECTION, SOLUTION INTRAVENOUS CONTINUOUS
Status: CANCELLED | OUTPATIENT
Start: 2021-09-30

## 2021-09-30 RX ORDER — CLONAZEPAM 0.5 MG/1
0.25 TABLET ORAL ONCE AS NEEDED
Status: COMPLETED | OUTPATIENT
Start: 2021-09-30 | End: 2021-09-30

## 2021-09-30 RX ORDER — IPRATROPIUM BROMIDE AND ALBUTEROL SULFATE 2.5; .5 MG/3ML; MG/3ML
3 SOLUTION RESPIRATORY (INHALATION) ONCE AS NEEDED
Status: DISCONTINUED | OUTPATIENT
Start: 2021-09-30 | End: 2021-09-30 | Stop reason: HOSPADM

## 2021-09-30 RX ORDER — FLUMAZENIL 0.1 MG/ML
0.2 INJECTION INTRAVENOUS AS NEEDED
Status: DISCONTINUED | OUTPATIENT
Start: 2021-09-30 | End: 2021-09-30 | Stop reason: HOSPADM

## 2021-09-30 RX ORDER — FENTANYL CITRATE 50 UG/ML
50 INJECTION, SOLUTION INTRAMUSCULAR; INTRAVENOUS
Status: CANCELLED | OUTPATIENT
Start: 2021-09-30

## 2021-09-30 RX ORDER — ACETAMINOPHEN 325 MG/1
650 TABLET ORAL EVERY 4 HOURS PRN
Status: DISCONTINUED | OUTPATIENT
Start: 2021-09-30 | End: 2021-10-01 | Stop reason: HOSPADM

## 2021-09-30 RX ORDER — ONDANSETRON 2 MG/ML
INJECTION INTRAMUSCULAR; INTRAVENOUS AS NEEDED
Status: DISCONTINUED | OUTPATIENT
Start: 2021-09-30 | End: 2021-09-30 | Stop reason: SURG

## 2021-09-30 RX ORDER — SODIUM CHLORIDE 0.9 % (FLUSH) 0.9 %
3 SYRINGE (ML) INJECTION EVERY 12 HOURS SCHEDULED
Status: DISCONTINUED | OUTPATIENT
Start: 2021-09-30 | End: 2021-09-30 | Stop reason: HOSPADM

## 2021-09-30 RX ORDER — SODIUM CHLORIDE 0.9 % (FLUSH) 0.9 %
3-10 SYRINGE (ML) INJECTION AS NEEDED
Status: CANCELLED | OUTPATIENT
Start: 2021-09-30

## 2021-09-30 RX ORDER — IBUPROFEN 600 MG/1
600 TABLET ORAL ONCE AS NEEDED
Status: DISCONTINUED | OUTPATIENT
Start: 2021-09-30 | End: 2021-09-30 | Stop reason: HOSPADM

## 2021-09-30 RX ADMIN — ROCURONIUM BROMIDE 5 MG: 50 INJECTION INTRAVENOUS at 12:37

## 2021-09-30 RX ADMIN — ROCURONIUM BROMIDE 10 MG: 50 INJECTION INTRAVENOUS at 14:36

## 2021-09-30 RX ADMIN — PROPOFOL 200 MG: 10 INJECTION, EMULSION INTRAVENOUS at 12:37

## 2021-09-30 RX ADMIN — CLONAZEPAM 0.25 MG: 0.5 TABLET ORAL at 18:50

## 2021-09-30 RX ADMIN — ROCURONIUM BROMIDE 10 MG: 50 INJECTION INTRAVENOUS at 14:11

## 2021-09-30 RX ADMIN — PHENYLEPHRINE HYDROCHLORIDE 100 MCG: 10 INJECTION, SOLUTION INTRAVENOUS at 12:46

## 2021-09-30 RX ADMIN — PHENYLEPHRINE HYDROCHLORIDE 100 MCG: 10 INJECTION, SOLUTION INTRAVENOUS at 12:51

## 2021-09-30 RX ADMIN — PHENYLEPHRINE HYDROCHLORIDE 100 MCG: 10 INJECTION, SOLUTION INTRAVENOUS at 13:21

## 2021-09-30 RX ADMIN — DEXAMETHASONE SODIUM PHOSPHATE 8 MG: 10 INJECTION INTRAMUSCULAR; INTRAVENOUS at 12:41

## 2021-09-30 RX ADMIN — APIXABAN 5 MG: 5 TABLET, FILM COATED ORAL at 22:29

## 2021-09-30 RX ADMIN — BUSPIRONE HYDROCHLORIDE 7.5 MG: 15 TABLET ORAL at 22:31

## 2021-09-30 RX ADMIN — GLYCOPYRROLATE 0.2 MG: 0.2 INJECTION INTRAMUSCULAR; INTRAVENOUS at 12:56

## 2021-09-30 RX ADMIN — ONDANSETRON 4 MG: 2 INJECTION INTRAMUSCULAR; INTRAVENOUS at 15:29

## 2021-09-30 RX ADMIN — ROCURONIUM BROMIDE 45 MG: 50 INJECTION INTRAVENOUS at 12:45

## 2021-09-30 RX ADMIN — LIDOCAINE HYDROCHLORIDE 100 MG: 20 INJECTION, SOLUTION INFILTRATION; PERINEURAL at 12:37

## 2021-09-30 RX ADMIN — FAMOTIDINE 20 MG: 10 INJECTION INTRAVENOUS at 11:28

## 2021-09-30 RX ADMIN — HYDROMORPHONE HYDROCHLORIDE 0.5 MG: 1 INJECTION, SOLUTION INTRAMUSCULAR; INTRAVENOUS; SUBCUTANEOUS at 19:51

## 2021-09-30 RX ADMIN — ROCURONIUM BROMIDE 10 MG: 50 INJECTION INTRAVENOUS at 13:50

## 2021-09-30 RX ADMIN — HYDROMORPHONE HYDROCHLORIDE 0.5 MG: 1 INJECTION, SOLUTION INTRAMUSCULAR; INTRAVENOUS; SUBCUTANEOUS at 22:23

## 2021-09-30 RX ADMIN — FENTANYL CITRATE 50 MCG: 50 INJECTION INTRAMUSCULAR; INTRAVENOUS at 16:21

## 2021-09-30 RX ADMIN — ROCURONIUM BROMIDE 20 MG: 50 INJECTION INTRAVENOUS at 13:15

## 2021-09-30 RX ADMIN — SODIUM CHLORIDE 75 ML/HR: 9 INJECTION, SOLUTION INTRAVENOUS at 11:27

## 2021-09-30 RX ADMIN — SUGAMMADEX 200 MG: 100 INJECTION, SOLUTION INTRAVENOUS at 15:31

## 2021-09-30 RX ADMIN — EPHEDRINE SULFATE 5 MG: 50 INJECTION INTRAVENOUS at 13:01

## 2021-09-30 RX ADMIN — MIDAZOLAM 1 MG: 1 INJECTION INTRAMUSCULAR; INTRAVENOUS at 11:38

## 2021-09-30 RX ADMIN — PROPOFOL 25 MCG/KG/MIN: 10 INJECTION, EMULSION INTRAVENOUS at 13:17

## 2021-09-30 RX ADMIN — SUCCINYLCHOLINE CHLORIDE 200 MG: 20 INJECTION, SOLUTION INTRAMUSCULAR; INTRAVENOUS; PARENTERAL at 12:38

## 2021-09-30 RX ADMIN — DIPHENHYDRAMINE HYDROCHLORIDE 10 MG: 50 INJECTION, SOLUTION INTRAMUSCULAR; INTRAVENOUS at 12:56

## 2021-09-30 RX ADMIN — HYDROCODONE BITARTRATE AND ACETAMINOPHEN 1 TABLET: 7.5; 325 TABLET ORAL at 23:56

## 2021-09-30 RX ADMIN — MIDAZOLAM 1 MG: 1 INJECTION INTRAMUSCULAR; INTRAVENOUS at 11:28

## 2021-09-30 RX ADMIN — ONDANSETRON 4 MG: 2 INJECTION INTRAMUSCULAR; INTRAVENOUS at 13:26

## 2021-09-30 RX ADMIN — SODIUM CHLORIDE: 9 INJECTION, SOLUTION INTRAVENOUS at 14:33

## 2021-09-30 RX ADMIN — PHENYLEPHRINE HYDROCHLORIDE 0.25 MCG/KG/MIN: 10 INJECTION INTRAVENOUS at 13:48

## 2021-09-30 RX ADMIN — HYDROCODONE BITARTRATE AND ACETAMINOPHEN 1 TABLET: 7.5; 325 TABLET ORAL at 16:45

## 2021-09-30 NOTE — ANESTHESIA POSTPROCEDURE EVALUATION
"Patient: Aris Cameron    Procedure Summary     Date: 09/30/21 Room / Location: [facility] CATH/EP LAB  /  [facility] CATH INVASIVE LOCATION    Anesthesia Start: 1232 Anesthesia Stop: 1602    Procedures:       Ablation atrial fibrillation--will also be ablation of atrial flutter (N/A )      3D MAPPING CARTO EP (N/A ) Diagnosis:       Atrial fibrillation with RVR (HCC)      (aF)    Providers: Terrance Le MD Provider: Wayne Keith MD    Anesthesia Type: general ASA Status: 3          Anesthesia Type: general    Vitals  Vitals Value Taken Time   /85 09/30/21 1631   Temp     Pulse 80 09/30/21 1633   Resp 18 09/30/21 1615   SpO2 95 % 09/30/21 1633   Vitals shown include unvalidated device data.        Post Anesthesia Care and Evaluation    Patient location during evaluation: bedside  Patient participation: complete - patient participated  Level of consciousness: awake  Pain score: 2  Pain management: adequate  Airway patency: patent  Anesthetic complications: No anesthetic complications  PONV Status: none  Cardiovascular status: acceptable  Respiratory status: acceptable  Hydration status: acceptable    Comments: /78   Pulse 83   Temp 36.6 °C (97.8 °F) (Temporal)   Resp 18   Ht 188 cm (74\")   Wt 132 kg (290 lb)   SpO2 93%   BMI 37.23 kg/m²         "

## 2021-09-30 NOTE — ANESTHESIA PROCEDURE NOTES
Airway  Urgency: elective    Date/Time: 9/30/2021 12:38 PM  Airway not difficult    General Information and Staff    Patient location during procedure: OR  Anesthesiologist: Wayne Keith MD  CRNA: Lorna Aguilar CRNA    Indications and Patient Condition  Indications for airway management: airway protection    Preoxygenated: yes  MILS maintained throughout  Mask difficulty assessment: 2 - vent by mask + OA or adjuvant +/- NMBA    Final Airway Details  Final airway type: endotracheal airway      Successful airway: ETT  Cuffed: yes   Successful intubation technique: direct laryngoscopy  Facilitating devices/methods: anterior pressure/BURP  Endotracheal tube insertion site: oral  Blade: Nini  Blade size: 4  ETT size (mm): 7.5  Cormack-Lehane Classification: grade IIa - partial view of glottis  Placement verified by: chest auscultation and capnometry   Cuff volume (mL): 8  Measured from: lips  ETT/EBT  to lips (cm): 22  Number of attempts at approach: 1  Assessment: lips, teeth, and gum same as pre-op and atraumatic intubation    Additional Comments  Atraumatic ET Tube placement.  Teeth as pre-op. BLEBS.  -ABD sounds.  +ET CO2.  Secured to face

## 2021-09-30 NOTE — ANESTHESIA PREPROCEDURE EVALUATION
Anesthesia Evaluation     Patient summary reviewed and Nursing notes reviewed   NPO Solid Status: > 8 hours  NPO Liquid Status: > 2 hours           Airway   Mallampati: II  TM distance: >3 FB  Neck ROM: full  Dental - normal exam     Pulmonary - normal exam   (+) sleep apnea,   Cardiovascular     (+) hypertension, CAD, dysrhythmias, hyperlipidemia,       Neuro/Psych  (+) psychiatric history,     GI/Hepatic/Renal/Endo    (+) morbid obesity, GERD,      Musculoskeletal     (+) neck pain,   Abdominal    Substance History   (+) alcohol use,      OB/GYN negative ob/gyn ROS         Other                        Anesthesia Plan    ASA 3     general       Anesthetic plan, all risks, benefits, and alternatives have been provided, discussed and informed consent has been obtained with: patient.

## 2021-10-01 VITALS
SYSTOLIC BLOOD PRESSURE: 112 MMHG | TEMPERATURE: 98.7 F | HEIGHT: 74 IN | HEART RATE: 68 BPM | WEIGHT: 290 LBS | RESPIRATION RATE: 18 BRPM | DIASTOLIC BLOOD PRESSURE: 74 MMHG | BODY MASS INDEX: 37.22 KG/M2 | OXYGEN SATURATION: 96 %

## 2021-10-01 LAB
DEPRECATED RDW RBC AUTO: 49 FL (ref 37–54)
ERYTHROCYTE [DISTWIDTH] IN BLOOD BY AUTOMATED COUNT: 13.5 % (ref 12.3–15.4)
HCT VFR BLD AUTO: 38.3 % (ref 37.5–51)
HGB BLD-MCNC: 13 G/DL (ref 13–17.7)
MCH RBC QN AUTO: 32.8 PG (ref 26.6–33)
MCHC RBC AUTO-ENTMCNC: 33.9 G/DL (ref 31.5–35.7)
MCV RBC AUTO: 96.7 FL (ref 79–97)
PLATELET # BLD AUTO: 194 10*3/MM3 (ref 140–450)
PMV BLD AUTO: 9.2 FL (ref 6–12)
QT INTERVAL: 401 MS
QT INTERVAL: 413 MS
RBC # BLD AUTO: 3.96 10*6/MM3 (ref 4.14–5.8)
WBC # BLD AUTO: 6.85 10*3/MM3 (ref 3.4–10.8)

## 2021-10-01 PROCEDURE — 93010 ELECTROCARDIOGRAM REPORT: CPT | Performed by: INTERNAL MEDICINE

## 2021-10-01 PROCEDURE — G0378 HOSPITAL OBSERVATION PER HR: HCPCS

## 2021-10-01 PROCEDURE — A9270 NON-COVERED ITEM OR SERVICE: HCPCS | Performed by: NURSE PRACTITIONER

## 2021-10-01 PROCEDURE — 99217 PR OBSERVATION CARE DISCHARGE MANAGEMENT: CPT | Performed by: NURSE PRACTITIONER

## 2021-10-01 PROCEDURE — 63710000001 HYDROCODONE-ACETAMINOPHEN 7.5-325 MG TABLET: Performed by: NURSE PRACTITIONER

## 2021-10-01 PROCEDURE — 93005 ELECTROCARDIOGRAM TRACING: CPT | Performed by: NURSE PRACTITIONER

## 2021-10-01 PROCEDURE — 85027 COMPLETE CBC AUTOMATED: CPT | Performed by: NURSE PRACTITIONER

## 2021-10-01 PROCEDURE — 93005 ELECTROCARDIOGRAM TRACING: CPT | Performed by: INTERNAL MEDICINE

## 2021-10-01 PROCEDURE — 63710000001 APIXABAN 5 MG TABLET: Performed by: NURSE PRACTITIONER

## 2021-10-01 PROCEDURE — 63710000001 METOPROLOL SUCCINATE XL 25 MG TABLET SUSTAINED-RELEASE 24 HOUR: Performed by: NURSE PRACTITIONER

## 2021-10-01 PROCEDURE — 25010000002 HYDROMORPHONE PER 4 MG: Performed by: NURSE PRACTITIONER

## 2021-10-01 RX ADMIN — METOPROLOL SUCCINATE 12.5 MG: 25 TABLET, EXTENDED RELEASE ORAL at 09:28

## 2021-10-01 RX ADMIN — HYDROCODONE BITARTRATE AND ACETAMINOPHEN 1 TABLET: 7.5; 325 TABLET ORAL at 06:04

## 2021-10-01 RX ADMIN — HYDROMORPHONE HYDROCHLORIDE 0.5 MG: 1 INJECTION, SOLUTION INTRAMUSCULAR; INTRAVENOUS; SUBCUTANEOUS at 05:18

## 2021-10-01 RX ADMIN — APIXABAN 5 MG: 5 TABLET, FILM COATED ORAL at 09:28

## 2021-10-01 RX ADMIN — HYDROMORPHONE HYDROCHLORIDE 0.5 MG: 1 INJECTION, SOLUTION INTRAMUSCULAR; INTRAVENOUS; SUBCUTANEOUS at 01:47

## 2021-10-01 RX ADMIN — HYDROCODONE BITARTRATE AND ACETAMINOPHEN 1 TABLET: 7.5; 325 TABLET ORAL at 11:40

## 2021-10-01 NOTE — CASE MANAGEMENT/SOCIAL WORK
Case Management Discharge Note      Final Note: Pt was dc'd home         Selected Continued Care - Discharged on 10/1/2021 Admission date: 9/30/2021 - Discharge disposition: Home or Self Care    Destination    No services have been selected for the patient.              Durable Medical Equipment    No services have been selected for the patient.              Dialysis/Infusion    No services have been selected for the patient.              Home Medical Care    No services have been selected for the patient.              Therapy    No services have been selected for the patient.              Community Resources    No services have been selected for the patient.              Community & DME    No services have been selected for the patient.                  Transportation Services  Private: Car    Final Discharge Disposition Code: 01 - home or self-care

## 2021-10-01 NOTE — DISCHARGE SUMMARY
DISCHARGE NOTE    Patient Name: Aris Cameron  Age/Sex: 56 y.o. male  : 1965  MRN: [id number]    Date of Discharge:  10/1/2021   Date of Admit: 2021  Encounter Provider: NGOC Neri  Place of Service: [facility] CARDIOLOGY  Patient Care Team:  Terrance Jasso MD as PCP - General (General Practice)  JEMIMA Baez MD as PCP - Internal Medicine    Subjective:     Discharge Diagnosis:    Atrial fibrillation with RVR ([facility])      Hospital Course:     56 yr old followed with Dr. Falcon, first saw him 2020---has also been seen in [facility] [facility]---he had both afib and atrial flutter, 2021--presented in rapid flutter, had CTI dependent flutter ablation.     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     He went to ED a few times post ablation at [place] was in AF. He had not returned for post procedure follow up until ---with his anxiety he just could not come to the office. When I saw him in the office on  he was in a rapid atrial flutter---he had been on his apixaban---he had symptoms of intermittent dyspnea and palpitations. Scheduled for redo ablation with PVI.    He was scheduled on  for redo ablation but did not show up for the procedure and we were not able to contact him that day but then he finally called the office and rescheduled the procedure.     He underwent PVI and some redo CTI burns---prior to to procedure in preop after receiving Versed he did not react well, became angry, changed into his clothes and was going to leave AMA---between us and his friend we were able to get him to stay and undergo the procedure.     He tolerated the procedure well---has had some anxiety post but not nearly as bad as before procedure.     He has some  chest soreness which is normal, EKG is okay and has maintained SR.     DC home today and follow up in 4 weeks.         Vital Signs  Temp:  [98.4 °F (36.9 °C)-98.7 °F (37.1 °C)] 98.7 °F (37.1 °C)  Heart Rate:  [62-86] 68  Resp:  [18] 18  BP: (112-142)/(71-98) 112/74    Intake/Output Summary (Last 24 hours) at 10/1/2021 1538  Last data filed at 9/30/2021 2347  Gross per 24 hour   Intake 800 ml   Output --   Net 800 ml       Physical Exam:    General Appearance: No acute distress, well developed and well nourished.   Eyes: Conjunctiva and lids: No erythema, swelling, or discharge. Sclera non-icteric.   HENT: Atraumatic, normocephalic. External eyes, ears, and nose normal.   Respiratory: No signs of respiratory distress. Respiration rhythm and depth normal.   Clear to auscultation. No rales, crackles, rhonchi, or wheezing auscultated.   Cardiovascular:  Heart Rate and Rhythm: Normal, Heart Sounds: Normal S1 and S2. No S3 or S4 noted.  Murmurs: No murmurs noted. No rubs, thrills, or gallops.   Arterial Pulses: Posterior tibialis and dorsalis pedis pulses normal.   Lower Extremities: No edema noted.  Gastrointestinal:  Abdomen soft, non-distended, non-tender.  Musculoskeletal: Normal movement of extremities  Skin: Warm and dry.   Psychiatric: Patient alert and oriented to person, place, and time. Speech and behavior appropriate. Normal mood and affect.    Labs:   Results from last 7 days   Lab Units 09/28/21  1648   SODIUM mmol/L 139   POTASSIUM mmol/L 4.0   CHLORIDE mmol/L 103   CO2 mmol/L 22.4   BUN mg/dL 10   CREATININE mg/dL 0.93   GLUCOSE mg/dL 96   CALCIUM mg/dL 10.0         Results from last 7 days   Lab Units 10/01/21  0519 09/28/21  1648   WBC 10*3/mm3 6.85 4.63   HEMOGLOBIN g/dL 13.0 16.2   HEMATOCRIT % 38.3 47.2   PLATELETS 10*3/mm3 194 236       Discharge Diet:    Dietary Orders (From admission, onward)     Start     Ordered    09/30/21 1635  Diet Regular  Diet Effective Now     Question:  Diet Texture /  Consistency  Answer:  Regular    09/30/21 1949                  Activity at Discharge:   Activity Instructions           No strenuous activities         instructions given to patient    Discharge Medications     Discharge Medications      Continue These Medications      Instructions Start Date   apixaban 5 MG tablet tablet  Commonly known as: ELIQUIS   5 mg, Oral, 2 Times Daily      busPIRone 7.5 MG tablet  Commonly known as: BUSPAR   Take 1 tablet prior to bedtime for anxiety      HYDROcodone-acetaminophen 7.5-325 MG per tablet  Commonly known as: NORCO   1 tablet, Oral, Every 6 Hours PRN      metoprolol succinate XL 25 MG 24 hr tablet  Commonly known as: TOPROL-XL   TAKE 1/2 TABLET BY MOUTH EVERY DAY FOR 30 DAYS      zolpidem CR 12.5 MG CR tablet  Commonly known as: AMBIEN CR   12.5 mg, Oral, Every Night at Bedtime             Discharge disposition: home    Follow-up Appointments  Follow-up Information     Margaret Escobar APRN Follow up in 1 month(s).    Specialty: Cardiology  Contact information:  [address]  [place] [address]  [phone]             [facility] .    Specialty: General Practice  Contact information:  [address]  [address]  [place] [id number] [phone]             JEMIMA Baez MD .    Specialty: General Practice  Contact information:  [address]  [place] [id number] [phone]                 No future appointments.      NGOC Neri  10/01/21  15:38 EDT

## 2021-11-02 ENCOUNTER — OFFICE VISIT (OUTPATIENT)
Dept: CARDIOLOGY | Facility: CLINIC | Age: 56
End: 2021-11-02

## 2021-11-02 VITALS
DIASTOLIC BLOOD PRESSURE: 86 MMHG | HEART RATE: 80 BPM | BODY MASS INDEX: 36.57 KG/M2 | HEIGHT: 74 IN | SYSTOLIC BLOOD PRESSURE: 128 MMHG | WEIGHT: 285 LBS

## 2021-11-02 DIAGNOSIS — I48.91 ATRIAL FIBRILLATION WITH RVR (HCC): Primary | ICD-10-CM

## 2021-11-02 DIAGNOSIS — I48.92 ATRIAL FLUTTER WITH RAPID VENTRICULAR RESPONSE (HCC): ICD-10-CM

## 2021-11-02 DIAGNOSIS — G47.33 OSA (OBSTRUCTIVE SLEEP APNEA): ICD-10-CM

## 2021-11-02 DIAGNOSIS — R06.09 DYSPNEA ON EXERTION: ICD-10-CM

## 2021-11-02 DIAGNOSIS — Z98.890 H/O CARDIAC RADIOFREQUENCY ABLATION: ICD-10-CM

## 2021-11-02 PROCEDURE — 99214 OFFICE O/P EST MOD 30 MIN: CPT | Performed by: NURSE PRACTITIONER

## 2021-11-02 PROCEDURE — 93000 ELECTROCARDIOGRAM COMPLETE: CPT | Performed by: NURSE PRACTITIONER

## 2021-11-02 RX ORDER — MULTIPLE VITAMINS W/ MINERALS TAB 9MG-400MCG
1 TAB ORAL DAILY
COMMUNITY

## 2021-11-02 NOTE — PROGRESS NOTES
"Date of Office Visit: 2021  Encounter Provider: NGOC Neri  Place of Service: [facility] CARDIOLOGY  Patient Name: Aris Cameron  :1965    Chief Complaint   Patient presents with   • Atrial Fibrillation     4 week hospital f/u   :     HPI: Aris Cameron is a 56 y.o. male who initially followed with Dr. Falcon--first saw him 2020, then saw [facility].     Echo 10/2020--normal LV function, saw NGOC Valentine 2020 had a nuc which showed no ischemia, continue w/CP so had a cath 2020 which showed normal coronaries.     2021 presented to ED w/CP---found to be in atrial flutter w/RVR, Dr. Le and I saw him---had CTI ablation.     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 He also has severe anxiety---this is a major problem for him---he has times that he has not kept follow up appts due to his anxiety.     He finally saw me in the office for follow up  On ---he was in rapid aflutter again----he had been in the ED twice since his ablation but had never made it to the office.     He was symptomatic with his recurrent AF and HR was rapid---he complained of dyspnea and palpitations---he thought it was his anxiety---I increased his BB and got him scheduled for redo ablation.     He did not show up the day of the procedure---he was \"too scared\"---he finally called and rescheduled and underwent PVI and redo CTI ablation on 2021.    He presents today for follow up.     He is doing pretty good---he complains of some upper chest soreness---I can actually reproduce it with palpation---he says it is there all the time but when I press on the area it makes it hurt more and he thinks he hurts more when he lays on his left side.     He has not been doing anything---\"I was afraid to do anything.\"    He also complains of some intermittent shortness of breath---when he bends over " and stands up---sometimes when he is laying down or just sitting watching TV he feels short of breath and has to get up and walk around to get relief.     He has TADEO but is intolerant to CPAP due to his anxiety.     He denies PND or edema and really has not had any palpitations.     He remains on apixaban for AC.    Past Medical History:   Diagnosis Date   • Alcohol abuse, daily use    • Anxiety    • Aortic root dilation (HCC)    • Ascending aorta dilation (HCC)    • Atrial fibrillation with RVR (HCC)    • Atrial flutter with rapid ventricular response (HCC)    • CAD (coronary artery disease)    • Chest pain    • Deaf    • Elevated LFTs    • GERD (gastroesophageal reflux disease)    • H/O cardiac radiofrequency ablation     CTI 5/2021   • HTN (hypertension)    • Hyperlipidemia    • Insomnia    • Musculoskeletal chest pain    • Neck pain    • Obesity    • TADEO (obstructive sleep apnea) 03/04/2021    Home sleep study.  Weight 287 pounds.  Moderate TADEO with AHI 25 events per hour.  No sleep-related hypoxia.  The patient snored 4% of the total monitoring time.   • PAF (paroxysmal atrial fibrillation) (HCC)    • Palpitations    • PONV (postoperative nausea and vomiting)     WITH GAS       Past Surgical History:   Procedure Laterality Date   • CARDIAC CATHETERIZATION N/A 12/17/2020    Procedure: Coronary angiography;  Surgeon: Geri Moya MD;  Location: [facility] CATH INVASIVE LOCATION;  Service: Cardiovascular;  Laterality: N/A;   • CARDIAC CATHETERIZATION N/A 12/17/2020    Procedure: Left heart cath;  Surgeon: Geri Moya MD;  Location: [facility] CATH INVASIVE LOCATION;  Service: Cardiovascular;  Laterality: N/A;   • CARDIAC CATHETERIZATION N/A 12/17/2020    Procedure: Left ventriculography;  Surgeon: Geri Moya MD;  Location: [facility] CATH INVASIVE LOCATION;  Service: Cardiovascular;  Laterality: N/A;   • CARDIAC ELECTROPHYSIOLOGY PROCEDURE N/A 5/4/2021    Procedure: Ablation atrial flutter;  Surgeon: Terrance Le  MD;  Location: [facility] CATH INVASIVE LOCATION;  Service: Cardiovascular;  Laterality: N/A;   • CARDIAC ELECTROPHYSIOLOGY PROCEDURE N/A 2021    Procedure: Ablation atrial fibrillation--will also be ablation of atrial flutter;  Surgeon: Terrance Le MD;  Location: [facility] INVASIVE LOCATION;  Service: Cardiovascular;  Laterality: N/A;   • HAND SURGERY     • INNER EAR SURGERY         Social History     Socioeconomic History   • Marital status: Single   Tobacco Use   • Smoking status: Never Smoker   • Smokeless tobacco: Never Used   • Tobacco comment: No caffeine   Vaping Use   • Vaping Use: Never used   Substance and Sexual Activity   • Alcohol use: Yes     Comment: 6BEER DAILY   • Drug use: Never   • Sexual activity: Yes       Family History   Problem Relation Age of Onset   • Coronary artery disease Mother         Mother with fatal MI at age 59   • Coronary artery disease Father         Father with MI at age 62   • Prostate cancer Father 59        malignant tumor of prostate  62   • Heart disease Brother 57        Brother with fatal MI at age 57       Review of Systems   Constitutional: Negative for chills, fever and malaise/fatigue.   Cardiovascular: Positive for chest pain and dyspnea on exertion. Negative for leg swelling, near-syncope, orthopnea, palpitations, paroxysmal nocturnal dyspnea and syncope.   Respiratory: Negative for cough and shortness of breath.    Hematologic/Lymphatic: Negative.    Musculoskeletal: Negative for joint pain, joint swelling and myalgias.   Gastrointestinal: Negative for abdominal pain, diarrhea, melena, nausea and vomiting.   Genitourinary: Negative for frequency and hematuria.   Neurological: Negative for light-headedness, numbness, paresthesias and seizures.   Allergic/Immunologic: Negative.    All other systems reviewed and are negative.      No Known Allergies      Current Outpatient Medications:   •  apixaban (ELIQUIS) 5 MG tablet tablet, Take 1 tablet by mouth 2  "(Two) Times a Day., Disp: 60 tablet, Rfl: 1  •  HYDROcodone-acetaminophen (NORCO) 7.5-325 MG per tablet, Take 1 tablet by mouth Every 6 (Six) Hours As Needed., Disp: , Rfl:   •  metoprolol succinate XL (TOPROL-XL) 25 MG 24 hr tablet, TAKE 1/2 TABLET BY MOUTH EVERY DAY FOR 30 DAYS, Disp: 45 tablet, Rfl: 1  •  multivitamin with minerals tablet tablet, Take 1 tablet by mouth Daily., Disp: , Rfl:   •  TURMERIC PO, Take  by mouth., Disp: , Rfl:   •  zolpidem CR (AMBIEN CR) 12.5 MG CR tablet, Take 12.5 mg by mouth every night at bedtime., Disp: , Rfl:       Objective:     Vitals:    11/02/21 0909   BP: 128/86   BP Location: Left arm   Patient Position: Sitting   Pulse: 80   Weight: 129 kg (285 lb)   Height: 188 cm (74\")     Body mass index is 36.59 kg/m².    PHYSICAL EXAM:    Vitals Reviewed.   General Appearance: No acute distress, well developed and well nourished.   Eyes: Conjunctiva and lids: No erythema, swelling, or discharge. Sclera non-icteric.   HENT: Atraumatic, normocephalic. External eyes, ears, and nose normal.   Respiratory: No signs of respiratory distress. Respiration rhythm and depth normal.   Clear to auscultation. No rales, crackles, rhonchi, or wheezing auscultated.   Cardiovascular:  Heart Rate and Rhythm: Normal, Heart Sounds: Normal S1 and S2. No S3 or S4 noted.  Murmurs: No murmurs noted. No rubs, thrills, or gallops.   Arterial Pulses:  Posterior tibialis and dorsalis pedis pulses normal.   Lower Extremities: No edema noted.  Gastrointestinal:  Abdomen soft, non-distended, non-tender.   Musculoskeletal: Normal movement of extremities  Skin and Nails: General appearance normal. No pallor, cyanosis, diaphoresis. Skin temperature normal. No clubbing of fingernails.   Psychiatric: Patient alert and oriented to person, place, and time. Speech and behavior appropriate. Normal mood and affect.       ECG 12 Lead    Date/Time: 11/2/2021 11:22 AM  Performed by: Margaret Escobar APRN  Authorized by: Shawn, " NGOC Lin   Comparison: compared with previous ECG   Similar to previous ECG  Rhythm: sinus rhythm  BPM: 80                Assessment:       Diagnosis Plan   1. Atrial fibrillation with RVR (HCC)  Adult Transthoracic Echo Complete w/ Color, Spectral and Contrast if Necessary Per Protocol   2. Atrial flutter with rapid ventricular response (HCC)     3. H/O cardiac radiofrequency ablation--CTI 5/2021     4. Dyspnea on exertion  Adult Transthoracic Echo Complete w/ Color, Spectral and Contrast if Necessary Per Protocol   5. TADEO (obstructive sleep apnea)            Plan:       1.-3. Afib and flutter, s/p CTI ablation in [address]---lfzzymkurz, had afib also, PVI and redo CTI in Sept---no recurrence that he is aware of---he complains of chest soreness---it is upper chest, lower neck area that is reproducible/exacerbated with palpation.     4. PFEIFFER---he had a normal cath in 12/2020---I will go ahead and check an echo since he was probably in AF w/RVR for sometime but he was euvolemic by exam and heart sounds were normal. It may be anxiety related.     5. TADEO---he is intolerant to CPAP---I ask him to check with his sleep doc and see if they had something that he was able to tolerate.     I will call with echo results and determine further follow up at that time. He ask me to call either Carlita or Jesse with info since he is deaf---they will relay message to him and both are on his contact list.     As always, it has been a pleasure to participate in your patient's care.      Sincerely,         NGOC Garcia

## 2021-11-10 ENCOUNTER — TELEPHONE (OUTPATIENT)
Dept: CARDIOLOGY | Facility: CLINIC | Age: 56
End: 2021-11-10

## 2021-11-10 NOTE — TELEPHONE ENCOUNTER
Pat called for pt asking if he was ok to go to a 5 day concert. He wanted to know if the loud noise and vibration would hurt his heart. I attempted to call Carlita back to let her know Margaret said we are not sure if it will be a problem for his arrhythmia but the alcohol intake will cause a problem. VM not set up so I will call back later...Susanna

## 2021-11-17 ENCOUNTER — APPOINTMENT (OUTPATIENT)
Dept: CARDIOLOGY | Facility: HOSPITAL | Age: 56
End: 2021-11-17

## 2021-11-22 ENCOUNTER — HOSPITAL ENCOUNTER (OUTPATIENT)
Dept: CARDIOLOGY | Facility: HOSPITAL | Age: 56
Discharge: HOME | End: 2021-11-22
Admitting: NURSE PRACTITIONER

## 2021-11-22 VITALS
WEIGHT: 285 LBS | HEIGHT: 74 IN | SYSTOLIC BLOOD PRESSURE: 128 MMHG | HEART RATE: 75 BPM | DIASTOLIC BLOOD PRESSURE: 80 MMHG | BODY MASS INDEX: 36.57 KG/M2

## 2021-11-22 DIAGNOSIS — R06.09 DYSPNEA ON EXERTION: ICD-10-CM

## 2021-11-22 DIAGNOSIS — I48.91 ATRIAL FIBRILLATION WITH RVR (HCC): ICD-10-CM

## 2021-11-22 PROCEDURE — 93306 TTE W/DOPPLER COMPLETE: CPT | Performed by: INTERNAL MEDICINE

## 2021-11-22 PROCEDURE — 93306 TTE W/DOPPLER COMPLETE: CPT

## 2021-11-23 ENCOUNTER — TELEPHONE (OUTPATIENT)
Dept: CARDIOLOGY | Facility: CLINIC | Age: 56
End: 2021-11-23

## 2021-11-23 DIAGNOSIS — I77.810 AORTIC ROOT DILATATION (HCC): Primary | ICD-10-CM

## 2021-11-23 LAB
AORTIC ARCH: 2.8 CM
ASCENDING AORTA: 4 CM
BH CV ECHO MEAS - ACS: 2.4 CM
BH CV ECHO MEAS - AO MAX PG (FULL): 2.3 MMHG
BH CV ECHO MEAS - AO MAX PG: 5.7 MMHG
BH CV ECHO MEAS - AO MEAN PG (FULL): 1.5 MMHG
BH CV ECHO MEAS - AO MEAN PG: 3.3 MMHG
BH CV ECHO MEAS - AO ROOT AREA (BSA CORRECTED): 1.8
BH CV ECHO MEAS - AO ROOT AREA: 16 CM^2
BH CV ECHO MEAS - AO ROOT DIAM: 4.5 CM
BH CV ECHO MEAS - AO V2 MAX: 119 CM/SEC
BH CV ECHO MEAS - AO V2 MEAN: 87.2 CM/SEC
BH CV ECHO MEAS - AO V2 VTI: 24.4 CM
BH CV ECHO MEAS - ASC AORTA: 4 CM
BH CV ECHO MEAS - AVA(I,A): 3.3 CM^2
BH CV ECHO MEAS - AVA(I,D): 3.3 CM^2
BH CV ECHO MEAS - AVA(V,A): 3.5 CM^2
BH CV ECHO MEAS - AVA(V,D): 3.5 CM^2
BH CV ECHO MEAS - BSA(HAYCOCK): 2.6 M^2
BH CV ECHO MEAS - BSA: 2.5 M^2
BH CV ECHO MEAS - BZI_BMI: 36.6 KILOGRAMS/M^2
BH CV ECHO MEAS - BZI_METRIC_HEIGHT: 188 CM
BH CV ECHO MEAS - BZI_METRIC_WEIGHT: 129.3 KG
BH CV ECHO MEAS - EDV(MOD-SP2): 118 ML
BH CV ECHO MEAS - EDV(MOD-SP4): 113 ML
BH CV ECHO MEAS - EDV(TEICH): 88 ML
BH CV ECHO MEAS - EF(CUBED): 65.1 %
BH CV ECHO MEAS - EF(MOD-BP): 61.1 %
BH CV ECHO MEAS - EF(MOD-SP2): 60.2 %
BH CV ECHO MEAS - EF(MOD-SP4): 61.9 %
BH CV ECHO MEAS - EF(TEICH): 56.8 %
BH CV ECHO MEAS - ESV(MOD-SP2): 47 ML
BH CV ECHO MEAS - ESV(MOD-SP4): 43 ML
BH CV ECHO MEAS - ESV(TEICH): 38 ML
BH CV ECHO MEAS - FS: 29.6 %
BH CV ECHO MEAS - IVS/LVPW: 1.1
BH CV ECHO MEAS - IVSD: 1.4 CM
BH CV ECHO MEAS - LAT PEAK E' VEL: 14.3 CM/SEC
BH CV ECHO MEAS - LV DIASTOLIC VOL/BSA (35-75): 44.7 ML/M^2
BH CV ECHO MEAS - LV MASS(C)D: 236.6 GRAMS
BH CV ECHO MEAS - LV MASS(C)DI: 93.6 GRAMS/M^2
BH CV ECHO MEAS - LV MAX PG: 3.3 MMHG
BH CV ECHO MEAS - LV MEAN PG: 1.8 MMHG
BH CV ECHO MEAS - LV SYSTOLIC VOL/BSA (12-30): 17 ML/M^2
BH CV ECHO MEAS - LV V1 MAX: 91.2 CM/SEC
BH CV ECHO MEAS - LV V1 MEAN: 63.5 CM/SEC
BH CV ECHO MEAS - LV V1 VTI: 17.6 CM
BH CV ECHO MEAS - LVIDD: 4.4 CM
BH CV ECHO MEAS - LVIDS: 3.1 CM
BH CV ECHO MEAS - LVLD AP2: 9.5 CM
BH CV ECHO MEAS - LVLD AP4: 9 CM
BH CV ECHO MEAS - LVLS AP2: 7.7 CM
BH CV ECHO MEAS - LVLS AP4: 7.4 CM
BH CV ECHO MEAS - LVOT AREA (M): 4.5 CM^2
BH CV ECHO MEAS - LVOT AREA: 4.6 CM^2
BH CV ECHO MEAS - LVOT DIAM: 2.4 CM
BH CV ECHO MEAS - LVPWD: 1.3 CM
BH CV ECHO MEAS - MED PEAK E' VEL: 6.7 CM/SEC
BH CV ECHO MEAS - MV A DUR: 0.13 SEC
BH CV ECHO MEAS - MV A MAX VEL: 55.7 CM/SEC
BH CV ECHO MEAS - MV DEC SLOPE: 362.7 CM/SEC^2
BH CV ECHO MEAS - MV DEC TIME: 0.26 SEC
BH CV ECHO MEAS - MV E MAX VEL: 65.8 CM/SEC
BH CV ECHO MEAS - MV E/A: 1.2
BH CV ECHO MEAS - MV MAX PG: 2.2 MMHG
BH CV ECHO MEAS - MV MEAN PG: 1.3 MMHG
BH CV ECHO MEAS - MV P1/2T MAX VEL: 70.6 CM/SEC
BH CV ECHO MEAS - MV P1/2T: 57 MSEC
BH CV ECHO MEAS - MV V2 MAX: 74.9 CM/SEC
BH CV ECHO MEAS - MV V2 MEAN: 55.6 CM/SEC
BH CV ECHO MEAS - MV V2 VTI: 21.6 CM
BH CV ECHO MEAS - MVA P1/2T LCG: 3.1 CM^2
BH CV ECHO MEAS - MVA(P1/2T): 3.9 CM^2
BH CV ECHO MEAS - MVA(VTI): 3.7 CM^2
BH CV ECHO MEAS - PA ACC TIME: 0.1 SEC
BH CV ECHO MEAS - PA MAX PG (FULL): 2.8 MMHG
BH CV ECHO MEAS - PA MAX PG: 5.7 MMHG
BH CV ECHO MEAS - PA PR(ACCEL): 36.2 MMHG
BH CV ECHO MEAS - PA V2 MAX: 119.3 CM/SEC
BH CV ECHO MEAS - PULM A REVS DUR: 0.09 SEC
BH CV ECHO MEAS - PULM A REVS VEL: 32.1 CM/SEC
BH CV ECHO MEAS - PULM DIAS VEL: 40.1 CM/SEC
BH CV ECHO MEAS - PULM S/D: 1.3
BH CV ECHO MEAS - PULM SYS VEL: 50.5 CM/SEC
BH CV ECHO MEAS - PVA(V,A): 4.6 CM^2
BH CV ECHO MEAS - PVA(V,D): 4.6 CM^2
BH CV ECHO MEAS - QP/QS: 1.1
BH CV ECHO MEAS - RV MAX PG: 2.9 MMHG
BH CV ECHO MEAS - RV MEAN PG: 2 MMHG
BH CV ECHO MEAS - RV V1 MAX: 84.8 CM/SEC
BH CV ECHO MEAS - RV V1 MEAN: 68.8 CM/SEC
BH CV ECHO MEAS - RV V1 VTI: 13.6 CM
BH CV ECHO MEAS - RVOT AREA: 6.4 CM^2
BH CV ECHO MEAS - RVOT DIAM: 2.9 CM
BH CV ECHO MEAS - SI(AO): 154.9 ML/M^2
BH CV ECHO MEAS - SI(CUBED): 22.1 ML/M^2
BH CV ECHO MEAS - SI(LVOT): 32 ML/M^2
BH CV ECHO MEAS - SI(MOD-SP2): 28.1 ML/M^2
BH CV ECHO MEAS - SI(MOD-SP4): 27.7 ML/M^2
BH CV ECHO MEAS - SI(TEICH): 19.8 ML/M^2
BH CV ECHO MEAS - SUP REN AO DIAM: 2.3 CM
BH CV ECHO MEAS - SV(AO): 391.4 ML
BH CV ECHO MEAS - SV(CUBED): 55.8 ML
BH CV ECHO MEAS - SV(LVOT): 80.9 ML
BH CV ECHO MEAS - SV(MOD-SP2): 71 ML
BH CV ECHO MEAS - SV(MOD-SP4): 70 ML
BH CV ECHO MEAS - SV(RVOT): 87.5 ML
BH CV ECHO MEAS - SV(TEICH): 50 ML
BH CV ECHO MEAS - TAPSE (>1.6): 2.4 CM
BH CV ECHO MEASUREMENTS AVERAGE E/E' RATIO: 6.27
BH CV XLRA - RV BASE: 3.6 CM
BH CV XLRA - RV LENGTH: 7.3 CM
BH CV XLRA - RV MID: 3.4 CM
BH CV XLRA - TDI S': 15.4 CM/SEC
LEFT ATRIUM VOLUME INDEX: 33 ML/M2
MAXIMAL PREDICTED HEART RATE: 166 BPM
SINUS: 4.3 CM
STJ: 3.3 CM
STRESS TARGET HR: 141 BPM

## 2021-11-23 NOTE — TELEPHONE ENCOUNTER
Called and spoke with Pat (friend) per patients request about echo results---she is a nurse, she will relay results to him.     Informed that I am going to refer him to CT surgery to follow his dilated aortic root/asc aorta---emphasized that this is to monitor as he gets very anxious.     Will also have scheduling call with follow up with Dr. Le in 6 months

## 2021-11-23 NOTE — TELEPHONE ENCOUNTER
I spoke with patient's friend, Carlita, he is scheduled 6/24/2022 with Dr. Le.     Thank you  Lulu

## 2021-12-01 ENCOUNTER — OFFICE VISIT (OUTPATIENT)
Dept: CARDIAC SURGERY | Facility: CLINIC | Age: 56
End: 2021-12-01

## 2021-12-01 VITALS
OXYGEN SATURATION: 96 % | TEMPERATURE: 97.8 F | BODY MASS INDEX: 38.6 KG/M2 | RESPIRATION RATE: 20 BRPM | SYSTOLIC BLOOD PRESSURE: 126 MMHG | WEIGHT: 285 LBS | HEART RATE: 60 BPM | DIASTOLIC BLOOD PRESSURE: 82 MMHG | HEIGHT: 72 IN

## 2021-12-01 DIAGNOSIS — I77.810 ASCENDING AORTA DILATION (HCC): ICD-10-CM

## 2021-12-01 DIAGNOSIS — I77.810 AORTIC ROOT DILATION (HCC): Primary | ICD-10-CM

## 2021-12-01 PROCEDURE — 99213 OFFICE O/P EST LOW 20 MIN: CPT | Performed by: NURSE PRACTITIONER

## 2021-12-01 RX ORDER — OXYCODONE AND ACETAMINOPHEN 10; 325 MG/1; MG/1
1 TABLET ORAL EVERY 6 HOURS PRN
COMMUNITY

## 2021-12-01 NOTE — PROGRESS NOTES
12/1/2021      Subjective:      Terrance Jasso MD    Chief Complaint: Evaluation of aortic root aneurysm, ascending aortic ectasia    History of Present Illness:       Dear Terrance Orellana MD and Colleagues,    It was nice to see Aris HINKLE Cameron in consultation at your request, Dr. Sexton has requested that I do his initial consultation.  He is a 56 y.o. 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  The CTA of chest on 5/3/2021 was reviewed and aortic root measures at 4.5 cm and ascending aorta measures 4.1 cm maximally, DTA 2.6 cm.  A 2D echo on 11/22/2021 demonstrates a trileaflet aortic valve with trace regurgitation.  He denies shortness of breath, chest/back pain with the exception of symptomology when he is having a panic attack, denies numbness/tingling/pain of extremities.  No vascular deficiencies or hyperextensible or hypermobile joints are noted on exam.  The patient's family history is negative for aneurysms or dissections, negative for connective tissue disease, and positive for coronary disease in first degree family members with his mother expiring at age 51 from an MI, brother expiring at age 57 from an MI, and father expiring at age 62 from an MI.  A cardiac cath in 12/2020 demonstrated normal coronary arteries.  Of note the patient is deaf and an  with clear face shield was utilized for lipreading.    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, denies any routine exercise routine      Patient Active Problem List   Diagnosis   • Atrial fibrillation with RVR  (HCC)   • HTN (hypertension)   • Deaf   • Obesity   • Aortic root dilation (HCC)   • Ascending aorta dilation (HCC)   • Hyperlipidemia   • Alcohol abuse, daily use   • Chest pain   • Elevated LFTs   • Precordial pain   • NSVT (nonsustained ventricular tachycardia) (HCC)   • Family history of coronary artery disease   • TADEO (obstructive sleep apnea)   • Atrial flutter with rapid ventricular response (HCC)   • CSA (central sleep apnea)   • Psychophysiological insomnia   • H/O cardiac radiofrequency ablation--CTI 5/2021, PVI & CTI 9/2021       Past Medical History:   Diagnosis Date   • Alcohol abuse, daily use    • Anxiety    • Aortic root dilation (HCC)    • Ascending aorta dilation (HCC)    • Atrial fibrillation with RVR (HCC)    • Atrial flutter with rapid ventricular response (HCC)    • CAD (coronary artery disease)    • Chest pain    • Deaf    • Elevated LFTs    • GERD (gastroesophageal reflux disease)    • H/O cardiac radiofrequency ablation     CTI 5/2021   • HTN (hypertension)    • Hyperlipidemia    • Insomnia    • Musculoskeletal chest pain    • Neck pain    • Obesity    • TADEO (obstructive sleep apnea) 03/04/2021    Home sleep study.  Weight 287 pounds.  Moderate TADEO with AHI 25 events per hour.  No sleep-related hypoxia.  The patient snored 4% of the total monitoring time.   • PAF (paroxysmal atrial fibrillation) (HCC)    • Palpitations    • PONV (postoperative nausea and vomiting)     WITH GAS       Past Surgical History:   Procedure Laterality Date   • CARDIAC CATHETERIZATION N/A 12/17/2020    Procedure: Coronary angiography;  Surgeon: Geri Moya MD;  Location: [facility] CATH INVASIVE LOCATION;  Service: Cardiovascular;  Laterality: N/A;   • CARDIAC CATHETERIZATION N/A 12/17/2020    Procedure: Left heart cath;  Surgeon: Geri Moya MD;  Location: [facility] CATH INVASIVE LOCATION;  Service: Cardiovascular;  Laterality: N/A;   • CARDIAC CATHETERIZATION N/A 12/17/2020    Procedure: Left ventriculography;   Surgeon: Geri Moya MD;  Location: [facility] CATH INVASIVE LOCATION;  Service: Cardiovascular;  Laterality: N/A;   • CARDIAC ELECTROPHYSIOLOGY PROCEDURE N/A 5/4/2021    Procedure: Ablation atrial flutter;  Surgeon: Terrance Le MD;  Location:  [facility] CATH INVASIVE LOCATION;  Service: Cardiovascular;  Laterality: N/A;   • CARDIAC ELECTROPHYSIOLOGY PROCEDURE N/A 9/30/2021    Procedure: Ablation atrial fibrillation--will also be ablation of atrial flutter;  Surgeon: Terrance Le MD;  Location: [facility] CATH INVASIVE LOCATION;  Service: Cardiovascular;  Laterality: N/A;   • HAND SURGERY     • INNER EAR SURGERY         No Known Allergies      Current Outpatient Medications:   •  apixaban (ELIQUIS) 5 MG tablet tablet, Take 1 tablet by mouth 2 (Two) Times a Day., Disp: 60 tablet, Rfl: 1  •  metoprolol succinate XL (TOPROL-XL) 25 MG 24 hr tablet, TAKE 1/2 TABLET BY MOUTH EVERY DAY FOR 30 DAYS, Disp: 45 tablet, Rfl: 1  •  multivitamin with minerals tablet tablet, Take 1 tablet by mouth Daily., Disp: , Rfl:   •  oxyCODONE-acetaminophen (PERCOCET)  MG per tablet, Take 1 tablet by mouth Every 6 (Six) Hours As Needed for Moderate Pain ., Disp: , Rfl:   •  TURMERIC PO, Take  by mouth., Disp: , Rfl:   •  zolpidem CR (AMBIEN CR) 12.5 MG CR tablet, Take 12.5 mg by mouth every night at bedtime., Disp: , Rfl:   •  HYDROcodone-acetaminophen (NORCO) 7.5-325 MG per tablet, Take 1 tablet by mouth Every 6 (Six) Hours As Needed., Disp: , Rfl:     Social History     Socioeconomic History   • Marital status: Single   Tobacco Use   • Smoking status: Never Smoker   • Smokeless tobacco: Never Used   • Tobacco comment: No caffeine   Vaping Use   • Vaping Use: Never used   Substance and Sexual Activity   • Alcohol use: Yes     Comment: 6BEER DAILY   • Drug use: Never   • Sexual activity: Yes       Family History   Problem Relation Age of Onset   • Coronary artery disease Mother         Mother with fatal MI at age 59   • Coronary artery  disease Father         Father with MI at age 62   • Prostate cancer Father 59        malignant tumor of prostate  62   • Heart disease Brother 57        Brother with fatal MI at age 57       The following portions of the patient's history were reviewed and updated as appropriate: allergies, current medications, past family history, past medical history, past social history, past surgical history and problem list.    Review of Systems:  Review of Systems   Constitutional: Negative for chills, diaphoresis and fatigue.   Respiratory: Negative for apnea, chest tightness, shortness of breath and wheezing.    Cardiovascular: Negative for palpitations and leg swelling. Chest pain: With anxiety.   Skin: Negative for color change and pallor.   Neurological: Negative for dizziness, seizures, syncope, weakness and light-headedness.   All other systems reviewed and are negative.      Physical Exam:    Vital Signs:  Weight: 129 kg (285 lb)   Body mass index is 38.65 kg/m².  Temp: 97.8 °F (36.6 °C)   Heart Rate: 60   BP: 126/82     Constitutional:       Appearance: Healthy appearance. Well-developed and well-groomed.   Neck:      Vascular: Normal carotid pulses. No carotid bruit or JVD.   Pulmonary:      Effort: Pulmonary effort is normal.      Breath sounds: Normal breath sounds. No decreased breath sounds. No wheezing. No rhonchi. No rales.   Cardiovascular:      Normal rate. Regular rhythm.      Murmurs: There is no murmur.      No gallop. No rub.   Pulses:     Carotid: 2+ bilaterally.     Radial: 2+ bilaterally.  Edema:     Peripheral edema absent.   Abdominal:      General: Bowel sounds are normal. There is no distension.      Palpations: Abdomen is soft. There is no abdominal mass.      Tenderness: There is no abdominal tenderness. There is no guarding.   Skin:     General: Skin is warm and dry.      Coloration: Skin is not pale.      Findings: No erythema or rash.   Neurological:      Mental Status: Alert, oriented to  person, place, and time and oriented to person, place and time.   Psychiatric:         Attention and Perception: Attention normal.         Mood and Affect: Mood normal.         Speech: Speech normal.         Behavior: Behavior normal. Behavior is cooperative.         Thought Content: Thought content normal.         Judgment: Judgment normal.          Assessment:     1.  Aortic root aneurysm, 4.5 cm-----trileaflet valve without significant regurgitation, no intervention warranted until valve involvement  2.  Ascending aortic ectasia-----stable at 4.1 cm  3.  Hypertension----well-controlled  4.  Paroxysmal atrial fibrillation status post ablation----on chronic anticoagulation with Eliquis  5.  Anxiety disorder, panic attacks  6.  Deaf---- utilized  7.  Excessive EtOH use    Recommendation/Plan:     CTA chest without contrast and 2D echocardiogram in 1 year    We discussed the importance of avoidance of over the counter decongestants and stimulants, specifically pseudoephedrine, for hypertension and aneurysm management    Risk factor modification of hypertension with a goal blood pressure less than 130/80, hyperlipidemia optimization, and continued avoidance of tobacco/nicotine products.    Initiation of low aerobic exercise is indicated to help reduce body habitus, assist with blood pressure and cholesterol control.       Although risk of rupture is low, emergency department presentation is warranted for acute chest, back, or abdominal pain, syncope, or stroke like symptoms    Thank you for allowing us to participate in his care.    Regards,    NGOC Salas    ** all problems new to this practitioner, extensive review of records prior and during patient interview, >25 minutes in face to face conversation regarding treatment plan, education, and answering any questions the patient may have had

## 2022-06-16 NOTE — TELEPHONE ENCOUNTER
Sorry Sir, they gave the wrong number    [phone]                     Cimzia Pregnancy And Lactation Text: This medication crosses the placenta but can be considered safe in certain situations. Cimzia may be excreted in breast milk.

## 2022-06-24 ENCOUNTER — OFFICE VISIT (OUTPATIENT)
Dept: CARDIOLOGY | Facility: CLINIC | Age: 57
End: 2022-06-24

## 2022-06-24 VITALS
DIASTOLIC BLOOD PRESSURE: 88 MMHG | SYSTOLIC BLOOD PRESSURE: 124 MMHG | HEART RATE: 79 BPM | HEIGHT: 74 IN | WEIGHT: 297 LBS | BODY MASS INDEX: 38.12 KG/M2

## 2022-06-24 DIAGNOSIS — R06.09 DOE (DYSPNEA ON EXERTION): ICD-10-CM

## 2022-06-24 DIAGNOSIS — Z98.890 H/O CARDIAC RADIOFREQUENCY ABLATION: Primary | ICD-10-CM

## 2022-06-24 DIAGNOSIS — R07.2 PRECORDIAL PAIN: ICD-10-CM

## 2022-06-24 PROCEDURE — 93000 ELECTROCARDIOGRAM COMPLETE: CPT | Performed by: INTERNAL MEDICINE

## 2022-06-24 PROCEDURE — 99214 OFFICE O/P EST MOD 30 MIN: CPT | Performed by: INTERNAL MEDICINE

## 2022-06-24 RX ORDER — SPIRONOLACTONE 25 MG/1
25 TABLET ORAL DAILY
Qty: 30 TABLET | Refills: 11 | Status: SHIPPED | OUTPATIENT
Start: 2022-06-24

## 2022-06-24 RX ORDER — CARVEDILOL 12.5 MG/1
12.5 TABLET ORAL 2 TIMES DAILY
Qty: 60 TABLET | Refills: 1 | Status: SHIPPED | OUTPATIENT
Start: 2022-06-24 | End: 2022-07-18

## 2022-06-24 RX ORDER — BUPROPION HYDROCHLORIDE 150 MG/1
1 TABLET ORAL DAILY
COMMUNITY
Start: 2022-06-20

## 2022-06-24 RX ORDER — ESCITALOPRAM OXALATE 10 MG/1
10 TABLET ORAL DAILY
COMMUNITY
Start: 2022-04-26

## 2022-06-24 NOTE — PROGRESS NOTES
Date of Office Visit: 2022  Encounter Provider: Terrance Le MD  Place of Service: [facility] CARDIOLOGY  Patient Name: Aris Cameron  : 1965    Subjective:     Encounter Date:2022      Patient ID: Aris Cameron is a 57 y.o. male who has a cc of  PAF and I have done both PVI and CTI in      He has no palp or tachy but he has chest pain and draper with exertion. Every time he exercises he get soa and tightness.     Pretty scary, really he says.        There have been no hospital admission since the last visit.     There have been no bleeding events.       Past Medical History:   Diagnosis Date   • Alcohol abuse, daily use    • Anxiety    • Aortic root dilation (HCC)    • Ascending aorta dilation (HCC)    • Atrial fibrillation with RVR (HCC)    • Atrial flutter with rapid ventricular response (HCC)    • CAD (coronary artery disease)    • Chest pain    • Deaf    • Elevated LFTs    • GERD (gastroesophageal reflux disease)    • H/O cardiac radiofrequency ablation     CTI 2021   • HTN (hypertension)    • Hyperlipidemia    • Insomnia    • Musculoskeletal chest pain    • Neck pain    • Obesity    • TADEO (obstructive sleep apnea) 2021    Home sleep study.  Weight 287 pounds.  Moderate TADEO with AHI 25 events per hour.  No sleep-related hypoxia.  The patient snored 4% of the total monitoring time.   • PAF (paroxysmal atrial fibrillation) (HCC)    • Palpitations    • PONV (postoperative nausea and vomiting)     WITH GAS       Social History     Socioeconomic History   • Marital status: Single   Tobacco Use   • Smoking status: Never Smoker   • Smokeless tobacco: Never Used   • Tobacco comment: No caffeine   Vaping Use   • Vaping Use: Never used   Substance and Sexual Activity   • Alcohol use: Yes     Comment: 6BEER DAILY   • Drug use: Never   • Sexual activity: Yes       Family History   Problem Relation Age of Onset   • Coronary artery disease Mother         Mother with  "fatal MI at age 59   • Coronary artery disease Father         Father with MI at age 62   • Prostate cancer Father 59        malignant tumor of prostate  62   • Heart disease Brother 57        Brother with fatal MI at age 57       ROS         Objective:     Vitals:    22 1120   BP: 124/88   Pulse: 79   Weight: 135 kg (297 lb)   Height: 188 cm (74\")         Eyes:      General:         Right eye: No discharge.         Left eye: No discharge.   HENT:      Head: Normocephalic and atraumatic.   Neck:      Thyroid: No thyromegaly.      Vascular: No JVD.   Pulmonary:      Effort: Pulmonary effort is normal.      Breath sounds: Normal breath sounds. No rales.   Cardiovascular:      Normal rate. Regular rhythm.      No gallop.   Edema:     Peripheral edema absent.   Abdominal:      General: Bowel sounds are normal.      Palpations: Abdomen is soft.      Tenderness: There is no abdominal tenderness.   Musculoskeletal: Normal range of motion.         General: No deformity. Skin:     General: Skin is warm and dry.      Findings: No erythema.   Neurological:      Mental Status: Alert and oriented to person, place, and time.      Motor: Normal muscle tone.   Psychiatric:         Behavior: Behavior normal.         Thought Content: Thought content normal.           ECG 12 Lead    Date/Time: 2022 11:53 AM  Performed by: Terrance Le MD  Authorized by: Terrance Le MD   Comparison: compared with previous ECG   Similar to previous ECG  Rhythm: sinus rhythm  Rate: normal  Conduction: conduction normal  ST Segments: ST segments normal  T Waves: T waves normal  QRS axis: normal    Clinical impression: normal ECG            Lab Review:       Assessment:          Diagnosis Plan   1. H/O cardiac radiofrequency ablation--CTI 2021, PVI & CTI 2021     2. Precordial pain     3. PFEIFFER (dyspnea on exertion)            Plan:     AF -- none since ablation. This is gratifying     He still has significant exertional symptoms. " He's worried about the symptoms. Nuclear and cath in 2020 were normal.     He's on opioids for neck pain ---     I will try some spironolactone and carvedilol -- this might reduce bp and help with any exercise induced symptoms.     Dilated aorta -- in aorta clinic

## 2022-07-18 RX ORDER — CARVEDILOL 12.5 MG/1
TABLET ORAL
Qty: 60 TABLET | Refills: 1 | Status: SHIPPED | OUTPATIENT
Start: 2022-07-18 | End: 2022-08-16

## 2022-08-16 RX ORDER — CARVEDILOL 12.5 MG/1
TABLET ORAL
Qty: 60 TABLET | Refills: 1 | Status: SHIPPED | OUTPATIENT
Start: 2022-08-16 | End: 2022-09-15

## 2022-08-24 ENCOUNTER — OFFICE VISIT (OUTPATIENT)
Dept: CARDIOLOGY | Facility: CLINIC | Age: 57
End: 2022-08-24

## 2022-08-24 VITALS
SYSTOLIC BLOOD PRESSURE: 120 MMHG | WEIGHT: 284 LBS | HEART RATE: 61 BPM | DIASTOLIC BLOOD PRESSURE: 86 MMHG | HEIGHT: 74 IN | BODY MASS INDEX: 36.45 KG/M2

## 2022-08-24 DIAGNOSIS — R06.09 DOE (DYSPNEA ON EXERTION): ICD-10-CM

## 2022-08-24 DIAGNOSIS — R07.9 CHEST PAIN, UNSPECIFIED TYPE: ICD-10-CM

## 2022-08-24 DIAGNOSIS — Z98.890 H/O CARDIAC RADIOFREQUENCY ABLATION: ICD-10-CM

## 2022-08-24 DIAGNOSIS — I48.92 ATRIAL FLUTTER WITH RAPID VENTRICULAR RESPONSE: ICD-10-CM

## 2022-08-24 DIAGNOSIS — I48.91 ATRIAL FIBRILLATION WITH RVR: Primary | ICD-10-CM

## 2022-08-24 PROCEDURE — 93000 ELECTROCARDIOGRAM COMPLETE: CPT

## 2022-08-24 PROCEDURE — 99214 OFFICE O/P EST MOD 30 MIN: CPT

## 2022-08-24 RX ORDER — ROSUVASTATIN CALCIUM 5 MG/1
5 TABLET, COATED ORAL DAILY
COMMUNITY
Start: 2022-06-23

## 2022-08-24 NOTE — PROGRESS NOTES
"Date of Office Visit: 2022  Encounter Provider: NGOC Morris  Place of Service: [facility] CARDIOLOGY  Patient Name: Aris Cameron  :1965    Chief Complaint   Patient presents with   • AFIB w/RVR     2 month f/u   • Atrial Flutter   • hx of ablation    :     HPI: Aris Cameron is a 57 y.o. male who is followed by  Dr. Le for atrial fibrillation and atrial flutter---s/p PVI and CTI in .     Echo 10/2020--normal LV function, saw NGOC Valentine 2020 had a nuc which showed no ischemia, continue w/CP so had a cath 2020 which showed normal coronaries.      2021 presented to ED w/CP---found to be in atrial flutter w/RVR, Dr. Le and I saw him---had CTI ablation.      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 He also has severe anxiety---this is a major problem for him---he has times that he has not kept follow up appts due to his anxiety.      He saw VS in the office for follow up  On 2021---he was in rapid aflutter again----he had been in the ED twice since his ablation but had never made it to the office.      He was symptomatic with his recurrent AF and HR was rapid---he complained of dyspnea and palpitations---he thought it was his anxiety---I increased his BB and got him scheduled for redo ablation.      He did not show up the day of the procedure---he was \"too scared\"---he finally called and rescheduled and underwent PVI and redo CTI ablation on 2021.    He saw Dr. Le in . He was not having anymore episodes of atrial fibrillation but was complaining of chest discomfort and dyspnea on exertion.  He was started on aldactone and carvedilol and advised to follow up in two months.                           He presents for follow up today.    He is deaf so there is some communications barrier.  He says that he has been having chest discomfort with exertion and " shortness of breath.     He says that he cannot do any physical exertion without getting chest tightness, short of breath, and he feels like he is more sweaty than usual with activity.     He says that this has not gotten any better since June and maybe a little worse.     He says that when he rests he is fine but any time he does any type of activity these symptoms come back, when he rests they go away. The pain usually lasts throughout the activity.     He had a normal echo last year, LVEF of 61%.     Normal heart cath and stress in 2020.     He is on BB, apixban, aldactone, and statin       Past Medical History:   Diagnosis Date   • Alcohol abuse, daily use    • Anxiety    • Aortic root dilation (HCC)    • Ascending aorta dilation (HCC)    • Atrial fibrillation with RVR (HCC)    • Atrial flutter with rapid ventricular response (HCC)    • CAD (coronary artery disease)    • Chest pain    • Deaf    • PFEIFFER (dyspnea on exertion)    • Elevated LFTs    • GERD (gastroesophageal reflux disease)    • H/O cardiac radiofrequency ablation     CTI 5/2021, PVI & CTI 9-2021   • HTN (hypertension)    • Hyperlipidemia    • Insomnia    • Musculoskeletal chest pain    • Neck pain    • Obesity    • TADEO (obstructive sleep apnea) 03/04/2021    Home sleep study.  Weight 287 pounds.  Moderate TADEO with AHI 25 events per hour.  No sleep-related hypoxia.  The patient snored 4% of the total monitoring time.   • PAF (paroxysmal atrial fibrillation) (HCC)    • Palpitations    • PONV (postoperative nausea and vomiting)     WITH GAS   • Precordial pain        Past Surgical History:   Procedure Laterality Date   • CARDIAC CATHETERIZATION N/A 12/17/2020    Procedure: Coronary angiography;  Surgeon: Geri Moya MD;  Location: [facility] INVASIVE LOCATION;  Service: Cardiovascular;  Laterality: N/A;   • CARDIAC CATHETERIZATION N/A 12/17/2020    Procedure: Left heart cath;  Surgeon: Geri Moya MD;  Location: [facility] INVASIVE LOCATION;   Service: Cardiovascular;  Laterality: N/A;   • CARDIAC CATHETERIZATION N/A 2020    Procedure: Left ventriculography;  Surgeon: Geri Moya MD;  Location:  [facility] CATH INVASIVE LOCATION;  Service: Cardiovascular;  Laterality: N/A;   • CARDIAC ELECTROPHYSIOLOGY PROCEDURE N/A 2021    Procedure: Ablation atrial flutter;  Surgeon: Terrance Le MD;  Location:  [facility] CATH INVASIVE LOCATION;  Service: Cardiovascular;  Laterality: N/A;   • CARDIAC ELECTROPHYSIOLOGY PROCEDURE N/A 2021    Procedure: Ablation atrial fibrillation--will also be ablation of atrial flutter;  Surgeon: Terrance Le MD;  Location:  [facility] CATH INVASIVE LOCATION;  Service: Cardiovascular;  Laterality: N/A;   • HAND SURGERY     • INNER EAR SURGERY         Social History     Socioeconomic History   • Marital status: Single   Tobacco Use   • Smoking status: Never Smoker   • Smokeless tobacco: Never Used   • Tobacco comment: No caffeine   Vaping Use   • Vaping Use: Never used   Substance and Sexual Activity   • Alcohol use: Yes     Comment: 6BEER DAILY   • Drug use: Never   • Sexual activity: Yes       Family History   Problem Relation Age of Onset   • Coronary artery disease Mother         Mother with fatal MI at age 59   • Coronary artery disease Father         Father with MI at age 62   • Prostate cancer Father 59        malignant tumor of prostate  62   • Heart disease Brother 57        Brother with fatal MI at age 57       Review of Systems   Constitutional: Negative for chills, fever and malaise/fatigue.   Cardiovascular: Positive for chest pain and dyspnea on exertion. Negative for leg swelling, near-syncope, orthopnea, palpitations, paroxysmal nocturnal dyspnea and syncope.   Respiratory: Negative for cough and shortness of breath.    Musculoskeletal: Negative for joint pain, joint swelling and myalgias.   Gastrointestinal: Negative for abdominal pain, diarrhea, melena, nausea and vomiting.   Genitourinary: Negative for  "frequency and hematuria.   Neurological: Negative for light-headedness, numbness, paresthesias and seizures.   Allergic/Immunologic: Negative.    All other systems reviewed and are negative.      No Known Allergies      Current Outpatient Medications:   •  apixaban (ELIQUIS) 5 MG tablet tablet, Take 1 tablet by mouth 2 (Two) Times a Day., Disp: 60 tablet, Rfl: 1  •  buPROPion XL (WELLBUTRIN XL) 150 MG 24 hr tablet, Take 1 tablet by mouth Daily., Disp: , Rfl:   •  carvedilol (COREG) 12.5 MG tablet, TAKE 1 TABLET BY MOUTH TWICE A DAY, Disp: 60 tablet, Rfl: 1  •  escitalopram (LEXAPRO) 10 MG tablet, Take 10 mg by mouth Daily., Disp: , Rfl:   •  multivitamin with minerals tablet tablet, Take 1 tablet by mouth Daily., Disp: , Rfl:   •  oxyCODONE-acetaminophen (PERCOCET)  MG per tablet, Take 1 tablet by mouth Every 6 (Six) Hours As Needed for Moderate Pain ., Disp: , Rfl:   •  rosuvastatin (CRESTOR) 5 MG tablet, Take 5 mg by mouth Daily., Disp: , Rfl:   •  spironolactone (ALDACTONE) 25 MG tablet, Take 1 tablet by mouth Daily., Disp: 30 tablet, Rfl: 11  •  TURMERIC PO, Take  by mouth., Disp: , Rfl:   •  zolpidem CR (AMBIEN CR) 12.5 MG CR tablet, Take 12.5 mg by mouth every night at bedtime., Disp: , Rfl:       Objective:     Vitals:    08/24/22 0946   BP: 120/86   Pulse: 61   Weight: 129 kg (284 lb)   Height: 188 cm (74\")     Body mass index is 36.46 kg/m².    PHYSICAL EXAM:    Vitals Reviewed.   General Appearance: No acute distress, well developed and well nourished.   Eyes: Conjunctiva and lids: No erythema, swelling, or discharge. Sclera non-icteric.   HENT: Atraumatic, normocephalic. External eyes, ears, and nose normal.   Respiratory: No signs of respiratory distress. Respiration rhythm and depth normal.   Clear to auscultation. No rales, crackles, rhonchi, or wheezing auscultated.   Cardiovascular:  Heart Rate and Rhythm: Normal, Heart Sounds: Normal S1 and S2. No S3 or S4 noted.  Gastrointestinal:  Abdomen " soft, non-distended, non-tender.   Musculoskeletal: Normal movement of extremities  Skin: Warm and dry.   Psychiatric: Patient alert and oriented to person, place, and time. Speech and behavior appropriate. Normal mood and affect.       ECG 12 Lead    Date/Time: 8/24/2022 10:29 AM  Performed by: Aaron Siddiqi APRN  Authorized by: Aaron Siddiqi APRN   Comparison: compared with previous ECG   Similar to previous ECG  Rhythm: sinus rhythm  BPM: 61                Assessment:       Diagnosis Plan   1. Atrial fibrillation with RVR (HCC)     2. Atrial flutter with rapid ventricular response (HCC)     3. H/O cardiac radiofrequency ablation--CTI 5/2021, PVI & CTI 9/2021     4. Chest pain, unspecified type  Stress Test With Myocardial Perfusion One Day   5. PFEIFFER (dyspnea on exertion)            Plan:   1-3. A fib, flutter--s/p PVI and CTI--- he has not had any known recurrence of AF since redo ablation in 2021.  He says that the symptoms he is experiencing right now does not feel anything like AF in the past.  He is on coreg and apixaban.      4-5. Chest pain, dyspnea-- he continues to complain of chest discomfort and dyspnea on exertion.  Seems to be worse since we last saw him in June. Had normal echo last year, normal cath in 2020.  He does not think he could walk on treadmill.  I will order a nuclear stress test. He is on coreg and statin.        If stress is normal then likely order a monitor to assess for arrhythmias.           As always, it has been a pleasure to participate in your patient's care.      Sincerely,         NGOC Morris

## 2022-09-01 ENCOUNTER — HOSPITAL ENCOUNTER (OUTPATIENT)
Dept: CARDIOLOGY | Facility: HOSPITAL | Age: 57
Discharge: HOME | End: 2022-09-01

## 2022-09-01 VITALS — WEIGHT: 284.39 LBS | HEIGHT: 74 IN | BODY MASS INDEX: 36.5 KG/M2

## 2022-09-01 DIAGNOSIS — R07.9 CHEST PAIN, UNSPECIFIED TYPE: ICD-10-CM

## 2022-09-01 LAB
BH CV NUCLEAR PRIOR STUDY: 2
BH CV REST NUCLEAR ISOTOPE DOSE: 11.6 MCI
BH CV STRESS COMMENTS STAGE 1: NORMAL
BH CV STRESS DOSE REGADENOSON STAGE 1: 0.4
BH CV STRESS DURATION MIN STAGE 1: 0
BH CV STRESS DURATION SEC STAGE 1: 10
BH CV STRESS NUCLEAR ISOTOPE DOSE: 35.4 MCI
BH CV STRESS PROTOCOL 1: NORMAL
BH CV STRESS RECOVERY BP: NORMAL MMHG
BH CV STRESS RECOVERY HR: 63 BPM
BH CV STRESS STAGE 1: 1
LV EF NUC BP: 55 %
MAXIMAL PREDICTED HEART RATE: 165 BPM
PERCENT MAX PREDICTED HR: 46.06 %
STRESS BASELINE BP: NORMAL MMHG
STRESS BASELINE HR: 65 BPM
STRESS PERCENT HR: 54 %
STRESS POST PEAK BP: NORMAL MMHG
STRESS POST PEAK HR: 76 BPM
STRESS TARGET HR: 140 BPM

## 2022-09-01 PROCEDURE — 25010000002 REGADENOSON 0.4 MG/5ML SOLUTION

## 2022-09-01 PROCEDURE — A9502 TC99M TETROFOSMIN: HCPCS

## 2022-09-01 PROCEDURE — 93018 CV STRESS TEST I&R ONLY: CPT | Performed by: INTERNAL MEDICINE

## 2022-09-01 PROCEDURE — 0 TECHNETIUM TETROFOSMIN KIT

## 2022-09-01 PROCEDURE — 93016 CV STRESS TEST SUPVJ ONLY: CPT | Performed by: INTERNAL MEDICINE

## 2022-09-01 PROCEDURE — 78452 HT MUSCLE IMAGE SPECT MULT: CPT | Performed by: INTERNAL MEDICINE

## 2022-09-01 PROCEDURE — 93017 CV STRESS TEST TRACING ONLY: CPT

## 2022-09-01 PROCEDURE — 78452 HT MUSCLE IMAGE SPECT MULT: CPT

## 2022-09-01 RX ADMIN — TETROFOSMIN 1 DOSE: 1.38 INJECTION, POWDER, LYOPHILIZED, FOR SOLUTION INTRAVENOUS at 13:14

## 2022-09-01 RX ADMIN — TETROFOSMIN 1 DOSE: 1.38 INJECTION, POWDER, LYOPHILIZED, FOR SOLUTION INTRAVENOUS at 14:00

## 2022-09-01 RX ADMIN — REGADENOSON 0.4 MG: 0.08 INJECTION, SOLUTION INTRAVENOUS at 14:00

## 2022-09-15 RX ORDER — CARVEDILOL 12.5 MG/1
TABLET ORAL
Qty: 180 TABLET | Refills: 0 | Status: SHIPPED | OUTPATIENT
Start: 2022-09-15 | End: 2022-12-19

## 2022-10-20 ENCOUNTER — TRANSCRIBE ORDERS (OUTPATIENT)
Dept: ADMINISTRATIVE | Facility: HOSPITAL | Age: 57
End: 2022-10-20

## 2022-10-20 DIAGNOSIS — R06.09 OTHER FORM OF DYSPNEA: ICD-10-CM

## 2022-11-01 DIAGNOSIS — R94.31 ABNORMAL ELECTROCARDIOGRAM (ECG) (EKG): ICD-10-CM

## 2022-11-01 DIAGNOSIS — I77.810 ASCENDING AORTA DILATION: Primary | ICD-10-CM

## 2022-11-02 RX ORDER — ALBUTEROL SULFATE 2.5 MG/3ML
2.5 SOLUTION RESPIRATORY (INHALATION) ONCE AS NEEDED
Status: CANCELLED | OUTPATIENT
Start: 2022-11-02

## 2022-11-03 ENCOUNTER — HOSPITAL ENCOUNTER (OUTPATIENT)
Dept: CARDIOLOGY | Facility: HOSPITAL | Age: 57
Discharge: HOME | End: 2022-11-03

## 2022-11-03 ENCOUNTER — TRANSCRIBE ORDERS (OUTPATIENT)
Dept: ADMINISTRATIVE | Facility: HOSPITAL | Age: 57
End: 2022-11-03

## 2022-11-03 ENCOUNTER — HOSPITAL ENCOUNTER (OUTPATIENT)
Dept: RESPIRATORY THERAPY | Facility: HOSPITAL | Age: 57
Discharge: HOME | End: 2022-11-03

## 2022-11-03 DIAGNOSIS — R06.09 OTHER FORM OF DYSPNEA: ICD-10-CM

## 2022-11-03 DIAGNOSIS — R06.09 OTHER FORM OF DYSPNEA: Primary | ICD-10-CM

## 2022-11-03 LAB
MAXIMAL PREDICTED HEART RATE: 165 BPM
STRESS TARGET HR: 140 BPM

## 2022-11-03 PROCEDURE — 93242 EXT ECG>48HR<7D RECORDING: CPT

## 2022-11-03 PROCEDURE — 94729 DIFFUSING CAPACITY: CPT

## 2022-11-03 PROCEDURE — 94726 PLETHYSMOGRAPHY LUNG VOLUMES: CPT

## 2022-11-03 PROCEDURE — 94060 EVALUATION OF WHEEZING: CPT

## 2022-11-03 PROCEDURE — 94640 AIRWAY INHALATION TREATMENT: CPT

## 2022-11-03 RX ORDER — ALBUTEROL SULFATE 2.5 MG/3ML
2.5 SOLUTION RESPIRATORY (INHALATION) ONCE
Status: COMPLETED | OUTPATIENT
Start: 2022-11-03 | End: 2022-11-03

## 2022-11-03 RX ADMIN — ALBUTEROL SULFATE 2.5 MG: 2.5 SOLUTION RESPIRATORY (INHALATION) at 09:04

## 2022-11-11 ENCOUNTER — TELEPHONE (OUTPATIENT)
Dept: CARDIAC SURGERY | Facility: CLINIC | Age: 57
End: 2022-11-11

## 2022-11-11 NOTE — TELEPHONE ENCOUNTER
Pt was called to schedule CT chest. Pt stated he is not interested in following up and that he is done with doctors. Referral closed. Recall pushed to next year.

## 2022-12-19 RX ORDER — CARVEDILOL 12.5 MG/1
TABLET ORAL
Qty: 180 TABLET | Refills: 0 | Status: SHIPPED | OUTPATIENT
Start: 2022-12-19

## 2023-02-01 ENCOUNTER — OFFICE (AMBULATORY)
Dept: URBAN - METROPOLITAN AREA CLINIC 76 | Facility: CLINIC | Age: 58
End: 2023-02-01

## 2023-02-01 VITALS — WEIGHT: 270 LBS | HEIGHT: 72 IN

## 2023-02-01 DIAGNOSIS — K92.1 MELENA: ICD-10-CM

## 2023-02-01 DIAGNOSIS — K64.1 SECOND DEGREE HEMORRHOIDS: ICD-10-CM

## 2023-02-01 DIAGNOSIS — F11.90 OPIOID USE, UNSPECIFIED, UNCOMPLICATED: ICD-10-CM

## 2023-02-01 DIAGNOSIS — Z83.71 FAMILY HISTORY OF COLONIC POLYPS: ICD-10-CM

## 2023-02-01 DIAGNOSIS — H91.90 UNSPECIFIED HEARING LOSS, UNSPECIFIED EAR: ICD-10-CM

## 2023-02-01 DIAGNOSIS — Z79.01 LONG TERM (CURRENT) USE OF ANTICOAGULANTS: ICD-10-CM

## 2023-02-01 PROCEDURE — 99204 OFFICE O/P NEW MOD 45 MIN: CPT | Performed by: INTERNAL MEDICINE

## 2023-02-20 ENCOUNTER — TELEPHONE (OUTPATIENT)
Dept: CARDIOLOGY | Facility: CLINIC | Age: 58
End: 2023-02-20
Payer: MEDICARE

## 2023-02-22 NOTE — TELEPHONE ENCOUNTER
2 days and yes
Clearance letter generated in Epic, signed by Dr. Le and faxed to Rosa Hardy at [phone].    
Pt is scheduling a colonoscopy with [facility]. They are asking if pt can move forward and if so how long can he hold his apixaban prior to procedure. Pt has NO history of stroke or valve replacement...Susanna    
89

## 2023-03-06 VITALS
RESPIRATION RATE: 19 BRPM | OXYGEN SATURATION: 99 % | RESPIRATION RATE: 17 BRPM | SYSTOLIC BLOOD PRESSURE: 121 MMHG | HEART RATE: 72 BPM | OXYGEN SATURATION: 94 % | SYSTOLIC BLOOD PRESSURE: 124 MMHG | RESPIRATION RATE: 15 BRPM | HEART RATE: 74 BPM | RESPIRATION RATE: 18 BRPM | TEMPERATURE: 98.1 F | SYSTOLIC BLOOD PRESSURE: 115 MMHG | OXYGEN SATURATION: 95 % | WEIGHT: 234 LBS | SYSTOLIC BLOOD PRESSURE: 123 MMHG | DIASTOLIC BLOOD PRESSURE: 78 MMHG | DIASTOLIC BLOOD PRESSURE: 75 MMHG | DIASTOLIC BLOOD PRESSURE: 73 MMHG | RESPIRATION RATE: 16 BRPM | SYSTOLIC BLOOD PRESSURE: 132 MMHG | HEART RATE: 69 BPM | HEART RATE: 68 BPM | DIASTOLIC BLOOD PRESSURE: 66 MMHG | HEART RATE: 85 BPM | DIASTOLIC BLOOD PRESSURE: 67 MMHG | SYSTOLIC BLOOD PRESSURE: 129 MMHG | HEART RATE: 38 BPM | HEART RATE: 75 BPM | SYSTOLIC BLOOD PRESSURE: 142 MMHG | OXYGEN SATURATION: 96 % | DIASTOLIC BLOOD PRESSURE: 97 MMHG | HEIGHT: 72 IN | SYSTOLIC BLOOD PRESSURE: 117 MMHG | HEART RATE: 67 BPM | DIASTOLIC BLOOD PRESSURE: 70 MMHG | HEART RATE: 37 BPM | DIASTOLIC BLOOD PRESSURE: 72 MMHG | HEART RATE: 65 BPM | DIASTOLIC BLOOD PRESSURE: 81 MMHG | DIASTOLIC BLOOD PRESSURE: 77 MMHG | OXYGEN SATURATION: 97 % | SYSTOLIC BLOOD PRESSURE: 114 MMHG

## 2023-03-08 ENCOUNTER — OFFICE (AMBULATORY)
Dept: URBAN - METROPOLITAN AREA PATHOLOGY 4 | Facility: PATHOLOGY | Age: 58
End: 2023-03-08

## 2023-03-08 ENCOUNTER — AMBULATORY SURGICAL CENTER (AMBULATORY)
Dept: URBAN - METROPOLITAN AREA SURGERY 17 | Facility: SURGERY | Age: 58
End: 2023-03-08

## 2023-03-08 DIAGNOSIS — K63.5 POLYP OF COLON: ICD-10-CM

## 2023-03-08 DIAGNOSIS — D12.3 BENIGN NEOPLASM OF TRANSVERSE COLON: ICD-10-CM

## 2023-03-08 DIAGNOSIS — Z86.010 PERSONAL HISTORY OF COLONIC POLYPS: ICD-10-CM

## 2023-03-08 LAB
GI HISTOLOGY: A. UNSPECIFIED: (no result)
GI HISTOLOGY: PDF REPORT: (no result)

## 2023-03-08 PROCEDURE — 45385 COLONOSCOPY W/LESION REMOVAL: CPT | Mod: PT | Performed by: INTERNAL MEDICINE

## 2023-03-08 PROCEDURE — 88305 TISSUE EXAM BY PATHOLOGIST: CPT | Performed by: INTERNAL MEDICINE

## 2023-03-08 RX ADMIN — SIMETHICONE 180 MG: 20 EMULSION ORAL at 08:10

## 2023-03-09 DIAGNOSIS — I79.0 ANEURYSM OF AORTA IN DISEASES CLASSIFIED ELSEWHERE: ICD-10-CM

## 2023-03-09 DIAGNOSIS — I77.810 ASCENDING AORTA DILATION: Primary | ICD-10-CM

## 2023-03-09 DIAGNOSIS — I77.810 AORTIC ROOT DILATION: ICD-10-CM

## 2023-03-24 ENCOUNTER — HOSPITAL ENCOUNTER (OUTPATIENT)
Dept: CARDIOLOGY | Facility: HOSPITAL | Age: 58
Discharge: HOME | End: 2023-03-24
Admitting: NURSE PRACTITIONER
Payer: MEDICARE

## 2023-03-24 VITALS
BODY MASS INDEX: 36.5 KG/M2 | DIASTOLIC BLOOD PRESSURE: 70 MMHG | HEART RATE: 57 BPM | WEIGHT: 284.39 LBS | SYSTOLIC BLOOD PRESSURE: 119 MMHG | HEIGHT: 74 IN

## 2023-03-24 DIAGNOSIS — I77.810 ASCENDING AORTA DILATION: ICD-10-CM

## 2023-03-24 DIAGNOSIS — I77.810 AORTIC ROOT DILATION: ICD-10-CM

## 2023-03-24 DIAGNOSIS — I79.0 ANEURYSM OF AORTA IN DISEASES CLASSIFIED ELSEWHERE: ICD-10-CM

## 2023-03-24 LAB
AORTIC DIMENSIONLESS INDEX: 0.7 (DI)
ASCENDING AORTA: 4 CM
BH CV ECHO MEAS - ACS: 2.4 CM
BH CV ECHO MEAS - AO MAX PG: 5.8 MMHG
BH CV ECHO MEAS - AO MEAN PG: 3.5 MMHG
BH CV ECHO MEAS - AO ROOT DIAM: 3.8 CM
BH CV ECHO MEAS - AO V2 MAX: 120.8 CM/SEC
BH CV ECHO MEAS - AO V2 VTI: 27.2 CM
BH CV ECHO MEAS - AVA(I,D): 3.3 CM2
BH CV ECHO MEAS - EDV(CUBED): 79.6 ML
BH CV ECHO MEAS - EDV(MOD-SP2): 129 ML
BH CV ECHO MEAS - EDV(MOD-SP4): 194 ML
BH CV ECHO MEAS - EF(MOD-BP): 59.1 %
BH CV ECHO MEAS - EF(MOD-SP2): 58.1 %
BH CV ECHO MEAS - EF(MOD-SP4): 58.8 %
BH CV ECHO MEAS - ESV(CUBED): 35.4 ML
BH CV ECHO MEAS - ESV(MOD-SP2): 54 ML
BH CV ECHO MEAS - ESV(MOD-SP4): 80 ML
BH CV ECHO MEAS - FS: 23.7 %
BH CV ECHO MEAS - IVS/LVPW: 1 CM
BH CV ECHO MEAS - IVSD: 1.38 CM
BH CV ECHO MEAS - LV MASS(C)D: 228.1 GRAMS
BH CV ECHO MEAS - LV MAX PG: 3.5 MMHG
BH CV ECHO MEAS - LV MEAN PG: 1.67 MMHG
BH CV ECHO MEAS - LV V1 MAX: 93 CM/SEC
BH CV ECHO MEAS - LV V1 VTI: 21 CM
BH CV ECHO MEAS - LVIDD: 4.3 CM
BH CV ECHO MEAS - LVIDS: 3.3 CM
BH CV ECHO MEAS - LVOT AREA: 4.3 CM2
BH CV ECHO MEAS - LVOT DIAM: 2.33 CM
BH CV ECHO MEAS - LVPWD: 1.38 CM
BH CV ECHO MEAS - MV A DUR: 0.15 SEC
BH CV ECHO MEAS - MV A MAX VEL: 66.8 CM/SEC
BH CV ECHO MEAS - MV DEC SLOPE: 404 CM/SEC2
BH CV ECHO MEAS - MV DEC TIME: 0.17 MSEC
BH CV ECHO MEAS - MV E MAX VEL: 75.4 CM/SEC
BH CV ECHO MEAS - MV E/A: 1.13
BH CV ECHO MEAS - MV MAX PG: 3.6 MMHG
BH CV ECHO MEAS - MV MEAN PG: 1 MMHG
BH CV ECHO MEAS - MV P1/2T: 67.9 MSEC
BH CV ECHO MEAS - MV V2 VTI: 29.1 CM
BH CV ECHO MEAS - MVA(P1/2T): 3.2 CM2
BH CV ECHO MEAS - MVA(VTI): 3.1 CM2
BH CV ECHO MEAS - PA ACC TIME: 0.12 SEC
BH CV ECHO MEAS - PA PR(ACCEL): 25.5 MMHG
BH CV ECHO MEAS - PA V2 MAX: 74.7 CM/SEC
BH CV ECHO MEAS - PI END-D VEL: 108.6 CM/SEC
BH CV ECHO MEAS - PULM A REVS DUR: 0.11 SEC
BH CV ECHO MEAS - PULM A REVS VEL: 24.1 CM/SEC
BH CV ECHO MEAS - PULM DIAS VEL: 28.6 CM/SEC
BH CV ECHO MEAS - PULM S/D: 0.79
BH CV ECHO MEAS - PULM SYS VEL: 22.4 CM/SEC
BH CV ECHO MEAS - QP/QS: 0.64
BH CV ECHO MEAS - RAP SYSTOLE: 3 MMHG
BH CV ECHO MEAS - RV MAX PG: 0.69 MMHG
BH CV ECHO MEAS - RV V1 MAX: 41.6 CM/SEC
BH CV ECHO MEAS - RV V1 VTI: 9.6 CM
BH CV ECHO MEAS - RVOT DIAM: 2.8 CM
BH CV ECHO MEAS - RVSP: 12 MMHG
BH CV ECHO MEAS - SV(LVOT): 89.9 ML
BH CV ECHO MEAS - SV(MOD-SP2): 75 ML
BH CV ECHO MEAS - SV(MOD-SP4): 114 ML
BH CV ECHO MEAS - SV(RVOT): 57.3 ML
BH CV ECHO MEAS - TAPSE (>1.6): 1.61 CM
BH CV ECHO MEAS - TR MAX PG: 8.6 MMHG
BH CV ECHO MEAS - TR MAX VEL: 146.6 CM/SEC
BH CV XLRA - RV BASE: 3.5 CM
BH CV XLRA - RV LENGTH: 7.7 CM
BH CV XLRA - RV MID: 2.11 CM
LEFT ATRIUM VOLUME INDEX: 27.5 ML/M2
MAXIMAL PREDICTED HEART RATE: 165 BPM
SINUS: 4.4 CM
STJ: 3.6 CM
STRESS TARGET HR: 140 BPM

## 2023-03-24 PROCEDURE — 93306 TTE W/DOPPLER COMPLETE: CPT

## 2023-03-24 PROCEDURE — 25510000001 PERFLUTREN (DEFINITY) 8.476 MG IN SODIUM CHLORIDE (PF) 0.9 % 10 ML INJECTION: Performed by: NURSE PRACTITIONER

## 2023-03-24 PROCEDURE — 93306 TTE W/DOPPLER COMPLETE: CPT | Performed by: INTERNAL MEDICINE

## 2023-03-24 RX ADMIN — SODIUM CHLORIDE 5 ML: 9 INJECTION INTRAMUSCULAR; INTRAVENOUS; SUBCUTANEOUS at 14:00

## 2023-04-27 ENCOUNTER — HOSPITAL ENCOUNTER (OUTPATIENT)
Dept: CT IMAGING | Facility: HOSPITAL | Age: 58
Discharge: HOME | End: 2023-04-27
Payer: MEDICARE

## 2023-04-27 DIAGNOSIS — I77.810 ASCENDING AORTA DILATION: ICD-10-CM

## 2023-04-27 PROCEDURE — 71250 CT THORAX DX C-: CPT

## 2023-05-08 ENCOUNTER — OFFICE VISIT (OUTPATIENT)
Dept: CARDIOLOGY | Facility: CLINIC | Age: 58
End: 2023-05-08
Payer: MEDICARE

## 2023-05-08 VITALS
BODY MASS INDEX: 34.78 KG/M2 | SYSTOLIC BLOOD PRESSURE: 120 MMHG | WEIGHT: 271 LBS | HEART RATE: 67 BPM | DIASTOLIC BLOOD PRESSURE: 70 MMHG | HEIGHT: 74 IN

## 2023-05-08 DIAGNOSIS — I48.91 ATRIAL FIBRILLATION WITH RVR: ICD-10-CM

## 2023-05-08 DIAGNOSIS — Z98.890 H/O CARDIAC RADIOFREQUENCY ABLATION: Primary | ICD-10-CM

## 2023-05-08 NOTE — PROGRESS NOTES
Date of Office Visit: 2023  Encounter Provider: Terrance Le MD  Place of Service: [facility] CARDIOLOGY  Patient Name: Aris Cameron  : 1965    Subjective:     Encounter Date:2023      Patient ID: Aris Cameron is a 57 y.o. male who has a cc of  PAF and I did PVI and CTI in .     He still has exertional dyspnea, and dizziness on touching his toes.    He no longer has tachycardia.     He is limiteed by draper and soa.         There have been no hospital admission since the last visit.     There have been no bleeding events.       Past Medical History:   Diagnosis Date   • Alcohol abuse, daily use    • Anxiety    • Aortic root dilation    • Ascending aorta dilation    • Atrial fibrillation with RVR    • Atrial flutter with rapid ventricular response    • CAD (coronary artery disease)    • Chest pain    • Deaf    • DRAPER (dyspnea on exertion)    • Elevated LFTs    • GERD (gastroesophageal reflux disease)    • H/O cardiac radiofrequency ablation     CTI 2021, PVI & CTI    • HTN (hypertension)    • Hyperlipidemia    • Insomnia    • Musculoskeletal chest pain    • Neck pain    • Obesity    • TADEO (obstructive sleep apnea) 2021    Home sleep study.  Weight 287 pounds.  Moderate TADEO with AHI 25 events per hour.  No sleep-related hypoxia.  The patient snored 4% of the total monitoring time.   • PAF (paroxysmal atrial fibrillation)    • Palpitations    • PONV (postoperative nausea and vomiting)     WITH GAS   • Precordial pain        Social History     Socioeconomic History   • Marital status: Single   Tobacco Use   • Smoking status: Never   • Smokeless tobacco: Never   • Tobacco comments:     No caffeine   Vaping Use   • Vaping Use: Never used   Substance and Sexual Activity   • Alcohol use: Yes     Comment: 6BEER DAILY   • Drug use: Never   • Sexual activity: Yes       Family History   Problem Relation Age of Onset   • Coronary artery disease Mother         Mother  "with fatal MI at age 59   • Coronary artery disease Father         Father with MI at age 62   • Prostate cancer Father 59        malignant tumor of prostate  62   • Heart disease Brother 57        Brother with fatal MI at age 57       Review of Systems   Constitutional: Negative for fever and night sweats.   HENT: Negative for ear pain and stridor.    Eyes: Negative for discharge and visual halos.   Cardiovascular: Negative for cyanosis.   Respiratory: Negative for hemoptysis and sputum production.    Hematologic/Lymphatic: Negative for adenopathy.   Skin: Negative for nail changes and unusual hair distribution.   Musculoskeletal: Positive for arthritis and joint pain. Negative for gout and joint swelling.   Gastrointestinal: Negative for bowel incontinence and flatus.   Genitourinary: Negative for dysuria and flank pain.   Neurological: Negative for seizures and tremors.   Psychiatric/Behavioral: Negative for altered mental status. The patient is not nervous/anxious.             Objective:     Vitals:    23 1239   BP: 120/70   Pulse: 67   Weight: 123 kg (271 lb)   Height: 188 cm (74\")         Eyes:      General:         Right eye: No discharge.         Left eye: No discharge.   HENT:      Head: Normocephalic and atraumatic.   Neck:      Thyroid: No thyromegaly.      Vascular: No JVD.   Pulmonary:      Effort: Pulmonary effort is normal.      Breath sounds: Normal breath sounds. No rales.   Cardiovascular:      Normal rate. Regular rhythm.      No gallop.   Edema:     Peripheral edema absent.   Abdominal:      General: Bowel sounds are normal.      Palpations: Abdomen is soft.      Tenderness: There is no abdominal tenderness.   Musculoskeletal: Normal range of motion.         General: No deformity. Skin:     General: Skin is warm and dry.      Findings: No erythema.   Neurological:      Mental Status: Alert and oriented to person, place, and time.      Motor: Normal muscle tone.   Psychiatric:         " Behavior: Behavior normal.         Thought Content: Thought content normal.           ECG 12 Lead    Date/Time: 5/8/2023 12:53 PM  Performed by: Terrance Le MD  Authorized by: Terrance Le MD   Comparison: compared with previous ECG   Similar to previous ECG  Rhythm: sinus rhythm and paced            Lab Review:       Assessment:          Diagnosis Plan   1. H/O cardiac radiofrequency ablation--CTI 5/2021, PVI & CTI 9/2021        2. Atrial fibrillation with RVR               Plan:     AF -- no symptoms and on a-ban.    HTN -- on meds.     PFEIFFER -- negative nuclear and negative echo.     He is overweight and deconditioned. Tho I wonder about rhythm.     I will try to order a monitor    He needs to lose weight and exercise.

## 2023-06-16 RX ORDER — SPIRONOLACTONE 25 MG/1
TABLET ORAL
Qty: 90 TABLET | Refills: 3 | Status: SHIPPED | OUTPATIENT
Start: 2023-06-16

## 2023-07-11 PROBLEM — I71.21 AORTIC ROOT ANEURYSM: Status: ACTIVE | Noted: 2023-07-11

## 2023-07-11 PROBLEM — F41.9 ANXIETY: Status: ACTIVE | Noted: 2023-07-11

## 2023-07-11 PROBLEM — Q25.43 AORTIC ROOT ANEURYSM: Status: ACTIVE | Noted: 2023-07-11

## 2023-07-11 PROBLEM — F41.0 PANIC ATTACKS: Status: ACTIVE | Noted: 2023-07-11

## 2023-09-25 LAB
MAXIMAL PREDICTED HEART RATE: 165 BPM
STRESS TARGET HR: 140 BPM

## 2024-05-20 ENCOUNTER — OFFICE VISIT (OUTPATIENT)
Age: 59
End: 2024-05-20
Payer: MEDICARE

## 2024-05-20 VITALS
DIASTOLIC BLOOD PRESSURE: 72 MMHG | BODY MASS INDEX: 40.77 KG/M2 | SYSTOLIC BLOOD PRESSURE: 124 MMHG | WEIGHT: 301 LBS | HEIGHT: 72 IN | HEART RATE: 69 BPM

## 2024-05-20 DIAGNOSIS — I48.92 ATRIAL FLUTTER WITH RAPID VENTRICULAR RESPONSE: Primary | ICD-10-CM

## 2024-05-20 DIAGNOSIS — Z98.890 H/O CARDIAC RADIOFREQUENCY ABLATION: ICD-10-CM

## 2024-05-20 DIAGNOSIS — I48.0 AF (PAROXYSMAL ATRIAL FIBRILLATION): ICD-10-CM

## 2024-05-20 DIAGNOSIS — I10 PRIMARY HYPERTENSION: ICD-10-CM

## 2024-05-20 PROBLEM — I48.91 ATRIAL FIBRILLATION WITH RVR: Status: RESOLVED | Noted: 2020-10-22 | Resolved: 2024-05-20

## 2024-05-20 PROCEDURE — 93000 ELECTROCARDIOGRAM COMPLETE: CPT | Performed by: INTERNAL MEDICINE

## 2024-05-20 PROCEDURE — 3078F DIAST BP <80 MM HG: CPT | Performed by: INTERNAL MEDICINE

## 2024-05-20 PROCEDURE — 3074F SYST BP LT 130 MM HG: CPT | Performed by: INTERNAL MEDICINE

## 2024-05-20 PROCEDURE — 99214 OFFICE O/P EST MOD 30 MIN: CPT | Performed by: INTERNAL MEDICINE

## 2024-05-20 NOTE — PROGRESS NOTES
Date of Office Visit: 2024  Encounter Provider: Terrance Le MD  Place of Service: [facility] CARDIOLOGY  Patient Name: Aris Cameron  : 1965    Subjective:     Encounter Date:2024      Patient ID: Aris Cameron is a 58 y.o. male who has a cc of PAF and HTN and obesity and he lives by himself     He reports no AF or palp.     He does have fatigue and tiredness and draper.     He has gained weight.     He has peripheral neuropathy symptoms. And worries about ATTR b/c of the commercials     There have been no hospital admission since the last visit.     There have been no bleeding events.       Past Medical History:   Diagnosis Date    Alcohol abuse, daily use     Anxiety     Aortic root dilation     Ascending aorta dilation     Atrial fibrillation with RVR     Atrial flutter with rapid ventricular response     CAD (coronary artery disease)     Chest pain     Deaf     DRAPER (dyspnea on exertion)     Elevated LFTs     GERD (gastroesophageal reflux disease)     H/O cardiac radiofrequency ablation     CTI 2021, PVI & CTI     HTN (hypertension)     Hyperlipidemia     Insomnia     Musculoskeletal chest pain     Neck pain     Obesity     TADEO (obstructive sleep apnea) 2021    Home sleep study.  Weight 287 pounds.  Moderate TADEO with AHI 25 events per hour.  No sleep-related hypoxia.  The patient snored 4% of the total monitoring time.    PAF (paroxysmal atrial fibrillation)     Palpitations     PONV (postoperative nausea and vomiting)     WITH GAS    Precordial pain        Social History     Socioeconomic History    Marital status: Single   Tobacco Use    Smoking status: Never    Smokeless tobacco: Never    Tobacco comments:     No caffeine   Vaping Use    Vaping status: Never Used   Substance and Sexual Activity    Alcohol use: Yes     Comment: 6BEER DAILY    Drug use: Never    Sexual activity: Yes       Family History   Problem Relation Age of Onset    Coronary artery  "disease Mother         Mother with fatal MI at age 59    Coronary artery disease Father         Father with MI at age 62    Prostate cancer Father 59        malignant tumor of prostate  62    Heart disease Brother 57        Brother with fatal MI at age 57       Review of Systems   Constitutional: Negative for fever and night sweats.   HENT:  Negative for ear pain and stridor.    Eyes:  Negative for discharge and visual halos.   Cardiovascular:  Negative for cyanosis.   Respiratory:  Negative for hemoptysis and sputum production.    Hematologic/Lymphatic: Negative for adenopathy.   Skin:  Negative for nail changes and unusual hair distribution.   Musculoskeletal:  Negative for gout and joint swelling.   Gastrointestinal:  Negative for bowel incontinence and flatus.   Genitourinary:  Negative for dysuria and flank pain.   Neurological:  Negative for seizures and tremors.   Psychiatric/Behavioral:  Negative for altered mental status. The patient is not nervous/anxious.             Objective:     Vitals:    24 0933   BP: 124/72   Pulse: 69   Weight: (!) 137 kg (301 lb)   Height: 182.9 cm (72\")         Eyes:      General:         Right eye: No discharge.         Left eye: No discharge.   HENT:      Head: Normocephalic and atraumatic.   Neck:      Thyroid: No thyromegaly.      Vascular: No JVD.   Pulmonary:      Effort: Pulmonary effort is normal.      Breath sounds: Normal breath sounds. No rales.   Cardiovascular:      Normal rate. Regular rhythm.      No gallop.    Edema:     Peripheral edema absent.   Abdominal:      General: Bowel sounds are normal.      Palpations: Abdomen is soft.      Tenderness: There is no abdominal tenderness.   Musculoskeletal: Normal range of motion.         General: No deformity. Skin:     General: Skin is warm and dry.      Findings: No erythema.   Neurological:      Mental Status: Alert and oriented to person, place, and time.      Motor: Normal muscle tone.   Psychiatric:        "  Behavior: Behavior normal.         Thought Content: Thought content normal.           ECG 12 Lead    Date/Time: 5/20/2024 10:05 AM  Performed by: Terrance Le MD    Authorized by: Terrance Le MD  Comparison: compared with previous ECG   Rhythm: sinus rhythm  Rate: normal  Conduction: conduction normal  ST Segments: ST segments normal  T Waves: T waves normal  QRS axis: normal    Clinical impression: normal ECG          Lab Review:       Assessment:          Diagnosis Plan   1. Atrial flutter with rapid ventricular response  Holter Monitor - 72 Hour Up To 15 Days      2. H/O cardiac radiofrequency ablation--CTI 5/2021, PVI & CTI 9/2021        3. AF (paroxysmal atrial fibrillation)        4. Primary hypertension               Plan:     His rhythm and exam seem fine. They got no data from the monitor at [facility] so I will repeat.     He has LVH but I doubt her has ATTR as he has HTN and obesity     He asked about semaglutide and I think he would be a good candidate.     Cardiac wise I think he is mostly 0k. I will check a zio to see if there is anything going on.     Echo here last year showed LVH.

## 2024-06-13 ENCOUNTER — TELEPHONE (OUTPATIENT)
Dept: CARDIOLOGY | Facility: CLINIC | Age: 59
End: 2024-06-13
Payer: MEDICARE

## 2024-06-13 NOTE — TELEPHONE ENCOUNTER
----- Message from Terrance Le sent at 6/12/2024  4:11 PM EDT -----  Can you let him know that his heart monitor looked great.  There were no severe arrhythmias and it is very reassuring.

## 2024-06-18 RX ORDER — SPIRONOLACTONE 25 MG/1
TABLET ORAL
Qty: 90 TABLET | Refills: 3 | Status: SHIPPED | OUTPATIENT
Start: 2024-06-18

## 2024-11-15 ENCOUNTER — OFFICE VISIT (OUTPATIENT)
Age: 59
End: 2024-11-15
Payer: MEDICARE

## 2024-11-15 VITALS
HEIGHT: 72 IN | HEART RATE: 76 BPM | DIASTOLIC BLOOD PRESSURE: 78 MMHG | SYSTOLIC BLOOD PRESSURE: 116 MMHG | WEIGHT: 289 LBS | BODY MASS INDEX: 39.14 KG/M2

## 2024-11-15 DIAGNOSIS — I48.0 AF (PAROXYSMAL ATRIAL FIBRILLATION): ICD-10-CM

## 2024-11-15 DIAGNOSIS — R07.9 CHEST PAIN, UNSPECIFIED TYPE: ICD-10-CM

## 2024-11-15 DIAGNOSIS — E78.5 HYPERLIPIDEMIA, UNSPECIFIED HYPERLIPIDEMIA TYPE: ICD-10-CM

## 2024-11-15 DIAGNOSIS — R00.2 PALPITATIONS: ICD-10-CM

## 2024-11-15 DIAGNOSIS — I10 PRIMARY HYPERTENSION: ICD-10-CM

## 2024-11-15 DIAGNOSIS — R06.09 DOE (DYSPNEA ON EXERTION): ICD-10-CM

## 2024-11-15 DIAGNOSIS — R42 DIZZINESS: Primary | ICD-10-CM

## 2024-11-15 PROCEDURE — 1160F RVW MEDS BY RX/DR IN RCRD: CPT | Performed by: PHYSICIAN ASSISTANT

## 2024-11-15 PROCEDURE — 93000 ELECTROCARDIOGRAM COMPLETE: CPT | Performed by: PHYSICIAN ASSISTANT

## 2024-11-15 PROCEDURE — 3074F SYST BP LT 130 MM HG: CPT | Performed by: PHYSICIAN ASSISTANT

## 2024-11-15 PROCEDURE — 99214 OFFICE O/P EST MOD 30 MIN: CPT | Performed by: PHYSICIAN ASSISTANT

## 2024-11-15 PROCEDURE — 1159F MED LIST DOCD IN RCRD: CPT | Performed by: PHYSICIAN ASSISTANT

## 2024-11-15 PROCEDURE — 3078F DIAST BP <80 MM HG: CPT | Performed by: PHYSICIAN ASSISTANT

## 2024-11-15 RX ORDER — ZOLPIDEM TARTRATE 10 MG/1
10 TABLET ORAL NIGHTLY PRN
COMMUNITY
Start: 2024-09-12

## 2024-11-15 RX ORDER — OXYCODONE AND ACETAMINOPHEN 10; 325 MG/1; MG/1
1 TABLET ORAL EVERY 6 HOURS PRN
COMMUNITY
Start: 2024-10-21

## 2024-11-15 RX ORDER — CARVEDILOL 3.12 MG/1
3.12 TABLET ORAL 2 TIMES DAILY WITH MEALS
COMMUNITY
Start: 2024-11-06

## 2024-11-15 NOTE — PROGRESS NOTES
"      ELECTROPHYSIOLOGY   Date of Office Visit: 11/15/2024  Patient Name: Aris Cameron  : 1965  Encounter Provider: Ilya Hauser PA-C  Primary Cardiologist: Dr. Jasso  Electrophysiologist: Dr. Le  CHIEF COMPLAINT / REASON FOR OFFICE VISIT     paroxysmal AFIB and Atrial Flutter w/RVR  6 month follow up     HISTORY OF PRESENT ILLNESS     This is a 59 y.o. year old male who presents to [facility] CARDIOLOGY for a for a 6 month follow up.     He has a history of paroxysmal atrial fibrillation and had a prior PVI abalation and CTI flutter ablation in 2021.     He has not had any AF since.     Today the patient reports lightheadedness and dyspnea with exertion. He states that this has been a continued problem since his ablation in . Whenever he bends over or exerts himself at work he sweats through his shirt, has SOA, and becomes lightheaded. However, he states he is able to walk far distances without triggering these episodes of dyspnea. He denies any chest pain during these episodes. He denies syncopal episodes.     He also mentions having short episodes of chest pain approximately 3x a week. These episodes only occur when he is at rest. He does not know of any trigger for these chest pains. They resolve spontaneously after a few minutes and do not occur with his episodes of dyspnea.     He recently had a Holter monitor on 2024 which showed 2.7% buren of PACs with short episodes of nonspecific atrial tachycardia but no evidence of AFIB.    He reports a recent decrease in medication in his carvedilol from 12.5mg to 3.125mg BID.     Apixaban 5 mg twice daily.     PMHx: PAF s/p PVI Ablation and CTI ablation for right atrial flutter in , HTN, HLD, TADEO, CAD    PHYSICAL EXAMINATION     Vital Signs:  /78 (BP Location: Right arm, Patient Position: Sitting)   Pulse 76   Ht 182.9 cm (72\")   Wt 131 kg (289 lb)   BMI 39.20 kg/m²   Estimated body mass index is " "39.2 kg/m² as calculated from the following:    Height as of this encounter: 182.9 cm (72\").    Weight as of this encounter: 131 kg (289 lb).         Physical Exam  Constitutional:       Appearance: Normal appearance. He is obese.   HENT:      Head: Normocephalic and atraumatic.      Right Ear: External ear normal. Decreased hearing noted.      Left Ear: External ear normal. Decreased hearing noted.      Ears:      Comments: Deaf in both ears.      Nose: Nose normal.      Mouth/Throat:      Mouth: Mucous membranes are moist.   Eyes:      Extraocular Movements: Extraocular movements intact.      Pupils: Pupils are equal, round, and reactive to light.   Cardiovascular:      Rate and Rhythm: Normal rate and regular rhythm.      Pulses: Normal pulses.      Heart sounds: Normal heart sounds. No murmur heard.     No friction rub. No gallop.   Pulmonary:      Effort: Pulmonary effort is normal.      Breath sounds: Normal breath sounds.   Abdominal:      General: Abdomen is flat.      Palpations: Abdomen is soft.   Musculoskeletal:         General: Normal range of motion.      Cervical back: Normal range of motion and neck supple.      Right lower leg: No edema.      Left lower leg: No edema.   Skin:     General: Skin is warm and dry.      Capillary Refill: Capillary refill takes less than 2 seconds.   Neurological:      General: No focal deficit present.      Mental Status: He is alert and oriented to person, place, and time. Mental status is at baseline.          Cardiac Testing/Results     Cardiac Testing:   Echo 05/2023: Calculated LVEF 59.1% LVH noted.   Stress Test 09/2022: No evidence of ischemia.     Result Review :  The following data was reviewed by: Ilya Hauser PA-C on 11/15/2024:       Lab Results   Component Value Date     (L) 11/18/2022     09/28/2021    K 4.2 11/18/2022    K 4.0 09/28/2021     11/18/2022     09/28/2021    CO2 23 11/18/2022    CO2 22.4 09/28/2021    BUN 10 " 11/18/2022    BUN 10 09/28/2021    CREATININE 0.96 11/18/2022    CREATININE 0.93 09/28/2021    EGFRIFNONA 85 09/28/2021    EGFRIFNONA 69 05/25/2021    EGFRIFAFRI >60 11/18/2022    EGFRIFAFRI >60 05/24/2021    GLUCOSE 96 09/28/2021    GLUCOSE 112 (H) 05/25/2021    CALCIUM 9.4 11/18/2022    CALCIUM 10.0 09/28/2021    ALBUMIN 4.2 11/18/2022    ALBUMIN 4.20 05/25/2021    AST 40 (H) 11/18/2022    AST 21 05/25/2021    ALT 53 11/18/2022    ALT 28 05/25/2021     Lab Results   Component Value Date    WBC 6.46 11/18/2022    WBC 6.85 10/01/2021    HGB 13.8 11/18/2022    HGB 13.0 10/01/2021    HCT 39.0 (L) 11/18/2022    HCT 38.3 10/01/2021    MCV 91.5 11/18/2022    MCV 96.7 10/01/2021     11/18/2022     10/01/2021     Lab Results   Component Value Date    PROBNP 154.4 05/25/2021    PROBNP 574.8 05/03/2021    BNP 24.6 11/18/2022    .8 (H) 05/24/2021     Lab Results   Component Value Date    TROPONINI <0.010 11/19/2022    TROPONINT <0.010 05/25/2021     Lab Results   Component Value Date    TSH 1.500 10/22/2020         Data reviewed : Cardiology studies ECG        ECG 12 Lead    Date/Time: 11/15/2024 10:44 AM  Performed by: Ilya Pennington PA-C    Authorized by: Ilya Pennington PA-C  Comparison: compared with previous ECG from 5/20/2024  Rhythm: sinus rhythm  Rate: normal  BPM: 76  Conduction: conduction normal  ST Segments: ST segments normal  QRS axis: normal                ASSESSMENT & PLAN       Diagnoses and all orders for this visit:    1. Dizziness (Primary)  -     Holter Monitor - 48 Hour; Future    2. Palpitations  -     Holter Monitor - 48 Hour; Future      Dizziness/Palpitations  -Pt states this has been a recurrent problem since his ablation in 2021.   -These episodes trigger with any physical exertion ranging from bending over put shoes on to digging with a shovel. However, ambulating far distances does not trigger these episodes.   -ECG is NSR.   -Will place patient on 48-hour  Holter monitor and have the patient trigger an episode for possible underlying arrhythmia.  Apparently had issues with one in past due to sweating.   -He reports no history of tobacco use. Will order for PFTs for possible pulmonary etiology.     Paroxysmal Atrial Fibrillation s/p Ablation in 2021  -14 day holter monitor on 05/2024 did not show any evidence of AFIB.   -AC on apixaban   -He denies any AF symptoms in the past year.     HTN  -Patient does not take his BP at home because it triggers his anxiety.   -BP in office is 116/78.   -His dyspnea/flushing episodes could be related to hypotension. Pt has been instructed to check his BP during this episodes of dyspnea.   -Carvedilol dosage was changed from 12.5mg BID to 3.125mg BID by PCP.     Follow Up:  Return in about 3 months (around 2/15/2025) for Dr. Le- Routine.  Patient was given instructions and counseling regarding his condition or for health maintenance advice. Please contact office if worsening symptoms or proceed to ER when appropriate.      Ilya Hauser PA-C  11/15/24  10:54 EST    MEDICATIONS         Discharge Medications            Accurate as of November 15, 2024 10:54 AM. If you have any questions, ask your nurse or doctor.                Changes to Medications        Instructions Start Date   carvedilol 3.125 MG tablet  Commonly known as: COREG  What changed: Another medication with the same name was removed. Continue taking this medication, and follow the directions you see here.  Changed by: Ilya Hauser   3.125 mg, 2 Times Daily With Meals             Continue These Medications        Instructions Start Date   apixaban 5 MG tablet tablet  Commonly known as: ELIQUIS   5 mg, Oral, 2 Times Daily      multivitamin with minerals tablet tablet   1 tablet, Daily      oxyCODONE-acetaminophen 7.5-325 MG per tablet  Commonly known as: PERCOCET   1 tablet, Every 6 Hours PRN      oxyCODONE-acetaminophen  MG per tablet  Commonly  known as: PERCOCET   1 tablet, Every 6 Hours PRN      rosuvastatin 5 MG tablet  Commonly known as: CRESTOR   5 mg, Daily      spironolactone 25 MG tablet  Commonly known as: ALDACTONE   TAKE 1 TABLET BY MOUTH EVERY DAY      TURMERIC PO   Take  by mouth.      zolpidem CR 12.5 MG CR tablet  Commonly known as: AMBIEN CR   12.5 mg, Every Night at Bedtime      zolpidem 10 MG tablet  Commonly known as: AMBIEN   10 mg, Nightly PRN                   **Megan Disclaimer: This note was dictated using an electronic transcription. The electronic translation of spoken language may permit erroneous, or at times, nonsensical words or phrases to be inadvertently transcribed. Although I have reviewed the note for such errors, some may still exist.

## 2025-03-07 ENCOUNTER — OFFICE VISIT (OUTPATIENT)
Age: 60
End: 2025-03-07
Payer: MEDICARE

## 2025-03-07 VITALS
HEART RATE: 70 BPM | HEIGHT: 72 IN | BODY MASS INDEX: 38.6 KG/M2 | SYSTOLIC BLOOD PRESSURE: 120 MMHG | DIASTOLIC BLOOD PRESSURE: 82 MMHG | WEIGHT: 285 LBS

## 2025-03-07 DIAGNOSIS — I48.0 AF (PAROXYSMAL ATRIAL FIBRILLATION): ICD-10-CM

## 2025-03-07 DIAGNOSIS — R07.2 PRECORDIAL PAIN: Primary | ICD-10-CM

## 2025-03-07 DIAGNOSIS — Z98.890 H/O CARDIAC RADIOFREQUENCY ABLATION: ICD-10-CM

## 2025-03-07 DIAGNOSIS — I10 PRIMARY HYPERTENSION: ICD-10-CM

## 2025-03-07 RX ORDER — SODIUM CHLORIDE 0.9 % (FLUSH) 0.9 %
10 SYRINGE (ML) INJECTION AS NEEDED
OUTPATIENT
Start: 2025-03-07

## 2025-03-07 RX ORDER — METOPROLOL TARTRATE 25 MG/1
100 TABLET, FILM COATED ORAL ONCE
OUTPATIENT
Start: 2025-03-07

## 2025-03-07 RX ORDER — IVABRADINE 5 MG/1
15 TABLET, FILM COATED ORAL ONCE
OUTPATIENT
Start: 2025-03-07 | End: 2025-03-07

## 2025-03-07 RX ORDER — METOPROLOL TARTRATE 1 MG/ML
5 INJECTION, SOLUTION INTRAVENOUS
OUTPATIENT
Start: 2025-03-07

## 2025-03-07 RX ORDER — METOPROLOL TARTRATE 50 MG
TABLET ORAL
Qty: 2 TABLET | Refills: 0 | Status: SHIPPED | OUTPATIENT
Start: 2025-03-07

## 2025-03-07 RX ORDER — NITROGLYCERIN 0.4 MG/1
0.8 TABLET SUBLINGUAL
OUTPATIENT
Start: 2025-03-07

## 2025-03-07 RX ORDER — NITROGLYCERIN 0.4 MG/1
0.4 TABLET SUBLINGUAL
OUTPATIENT
Start: 2025-03-07 | End: 2025-03-07

## 2025-03-07 RX ORDER — METOPROLOL TARTRATE 50 MG
50 TABLET ORAL
OUTPATIENT
Start: 2025-03-07

## 2025-03-07 RX ORDER — METOPROLOL TARTRATE 25 MG/1
150 TABLET, FILM COATED ORAL ONCE
OUTPATIENT
Start: 2025-03-07

## 2025-03-07 RX ORDER — METOPROLOL TARTRATE 25 MG/1
200 TABLET, FILM COATED ORAL ONCE
OUTPATIENT
Start: 2025-03-07 | End: 2025-03-07

## 2025-03-07 RX ORDER — SODIUM CHLORIDE 9 MG/ML
40 INJECTION, SOLUTION INTRAVENOUS AS NEEDED
OUTPATIENT
Start: 2025-03-07

## 2025-03-07 RX ORDER — SODIUM CHLORIDE 0.9 % (FLUSH) 0.9 %
10 SYRINGE (ML) INJECTION EVERY 12 HOURS SCHEDULED
OUTPATIENT
Start: 2025-03-07

## 2025-03-07 RX ORDER — METOPROLOL TARTRATE 25 MG/1
50 TABLET, FILM COATED ORAL ONCE
OUTPATIENT
Start: 2025-03-07

## 2025-03-07 NOTE — PROGRESS NOTES
Date of Office Visit: 2025  Encounter Provider: Terrance Le MD  Place of Service: [facility] CARDIOLOGY  Patient Name: Aris Cameron  : 1965    Subjective:     Encounter Date:2025      Patient ID: Aris Cameron is a 59 y.o. male who has a cc of  PAF and I did PVI and CTI in 21. No AF since.     The patient had a good year AF wise  No anginal chest pain, Some atypical arm pain --likely parathesias   He has draper and easy sweating    No soa,   No fainting,  No orthostasis.   No edema.   Exercise tolerance: limited     There have been no hospital admission since the last visit.     There have been no bleeding events.       Past Medical History:   Diagnosis Date    Alcohol abuse, daily use     Anxiety     Aortic root dilation     Ascending aorta dilation     Atrial fibrillation with RVR     Atrial flutter with rapid ventricular response     CAD (coronary artery disease)     Chest pain     Deaf     DRAPER (dyspnea on exertion)     Elevated LFTs     GERD (gastroesophageal reflux disease)     H/O cardiac radiofrequency ablation     CTI 2021, PVI & CTI     HTN (hypertension)     Hyperlipidemia     Insomnia     Musculoskeletal chest pain     Neck pain     Obesity     TADEO (obstructive sleep apnea) 2021    Home sleep study.  Weight 287 pounds.  Moderate TADEO with AHI 25 events per hour.  No sleep-related hypoxia.  The patient snored 4% of the total monitoring time.    PAF (paroxysmal atrial fibrillation)     Palpitations     PONV (postoperative nausea and vomiting)     WITH GAS    Precordial pain        Social History     Socioeconomic History    Marital status: Single   Tobacco Use    Smoking status: Never    Smokeless tobacco: Never    Tobacco comments:     No caffeine   Vaping Use    Vaping status: Never Used   Substance and Sexual Activity    Alcohol use: Yes     Comment: 6BEER DAILY    Drug use: Never    Sexual activity: Yes       Family History   Problem Relation  "Age of Onset    Coronary artery disease Mother         Mother with fatal MI at age 59    Coronary artery disease Father         Father with MI at age 62    Prostate cancer Father 59        malignant tumor of prostate  62    Heart disease Brother 57        Brother with fatal MI at age 57       Review of Systems   Constitutional: Negative for fever and night sweats.   HENT:  Negative for ear pain and stridor.    Eyes:  Negative for discharge and visual halos.   Cardiovascular:  Negative for cyanosis.   Respiratory:  Negative for hemoptysis and sputum production.    Hematologic/Lymphatic: Negative for adenopathy.   Skin:  Negative for nail changes and unusual hair distribution.   Musculoskeletal:  Positive for arthritis and joint pain. Negative for gout and joint swelling.   Gastrointestinal:  Negative for bowel incontinence and flatus.   Genitourinary:  Negative for dysuria and flank pain.   Neurological:  Negative for seizures and tremors.   Psychiatric/Behavioral:  Negative for altered mental status. The patient is not nervous/anxious.             Objective:     Vitals:    25 0915   BP: 120/82   BP Location: Right arm   Patient Position: Sitting   Pulse: 70   Weight: 129 kg (285 lb)   Height: 182.9 cm (72\")         Eyes:      General:         Right eye: No discharge.         Left eye: No discharge.   HENT:      Head: Normocephalic and atraumatic.   Neck:      Thyroid: No thyromegaly.      Vascular: No JVD.   Pulmonary:      Effort: Pulmonary effort is normal.      Breath sounds: Normal breath sounds. No rales.   Cardiovascular:      Normal rate. Regular rhythm.      No gallop.    Edema:     Peripheral edema absent.   Abdominal:      General: Bowel sounds are normal.      Palpations: Abdomen is soft.      Tenderness: There is no abdominal tenderness.   Musculoskeletal: Normal range of motion.         General: No deformity. Skin:     General: Skin is warm and dry.      Findings: No erythema.   Neurological: "      Mental Status: Alert and oriented to person, place, and time.      Motor: Normal muscle tone.   Psychiatric:         Behavior: Behavior normal.         Thought Content: Thought content normal.           ECG 12 Lead    Date/Time: 3/7/2025 9:56 AM  Performed by: Terrance Le MD    Authorized by: Terrance Le MD  Comparison: compared with previous ECG   Rhythm: sinus rhythm  Rate: normal  Conduction: conduction normal  ST Segments: ST segments normal  T Waves: T waves normal  QRS axis: normal    Clinical impression: normal ECG          Lab Review:       Assessment:          Diagnosis Plan   1. H/O cardiac radiofrequency ablation--CTI 5/2021, PVI & CTI 9/2021        2. AF (paroxysmal atrial fibrillation)        3. Primary hypertension               Plan:      No AF -- but still on a-ban due to HTN     Atypical PFEIFFER and sweaty., Also with dilated aorta     Will get CCTA. Low suspicion but with his deafness I worry about history

## 2025-03-18 ENCOUNTER — TELEPHONE (OUTPATIENT)
Dept: CARDIOLOGY | Facility: HOSPITAL | Age: 60
End: 2025-03-18
Payer: MEDICARE

## 2025-03-19 ENCOUNTER — HOSPITAL ENCOUNTER (OUTPATIENT)
Dept: CT IMAGING | Facility: HOSPITAL | Age: 60
Discharge: HOME | End: 2025-03-19
Payer: MEDICARE

## 2025-03-19 ENCOUNTER — RESULTS FOLLOW-UP (OUTPATIENT)
Dept: CT IMAGING | Facility: HOSPITAL | Age: 60
End: 2025-03-19
Payer: MEDICARE

## 2025-03-19 ENCOUNTER — HOSPITAL ENCOUNTER (OUTPATIENT)
Dept: CARDIOLOGY | Facility: HOSPITAL | Age: 60
Discharge: HOME | End: 2025-03-19
Payer: MEDICARE

## 2025-03-19 VITALS — RESPIRATION RATE: 16 BRPM | DIASTOLIC BLOOD PRESSURE: 67 MMHG | HEART RATE: 57 BPM | SYSTOLIC BLOOD PRESSURE: 105 MMHG

## 2025-03-19 DIAGNOSIS — R07.2 PRECORDIAL PAIN: ICD-10-CM

## 2025-03-19 DIAGNOSIS — R07.9 CHEST PAIN, UNSPECIFIED TYPE: Primary | ICD-10-CM

## 2025-03-19 PROCEDURE — 63710000001 METOPROLOL TARTRATE 50 MG TABLET: Performed by: INTERNAL MEDICINE

## 2025-03-19 PROCEDURE — 36415 COLL VENOUS BLD VENIPUNCTURE: CPT

## 2025-03-19 PROCEDURE — A9270 NON-COVERED ITEM OR SERVICE: HCPCS | Performed by: INTERNAL MEDICINE

## 2025-03-19 PROCEDURE — 25510000001 IOPAMIDOL PER 1 ML: Performed by: INTERNAL MEDICINE

## 2025-03-19 PROCEDURE — 75574 CT ANGIO HRT W/3D IMAGE: CPT

## 2025-03-19 RX ORDER — IOPAMIDOL 755 MG/ML
100 INJECTION, SOLUTION INTRAVASCULAR
Status: COMPLETED | OUTPATIENT
Start: 2025-03-19 | End: 2025-03-19

## 2025-03-19 RX ORDER — METOPROLOL TARTRATE 50 MG
50 TABLET ORAL ONCE
Status: COMPLETED | OUTPATIENT
Start: 2025-03-19 | End: 2025-03-19

## 2025-03-19 RX ORDER — NITROGLYCERIN 0.4 MG/1
0.8 TABLET SUBLINGUAL ONCE
Status: DISCONTINUED | OUTPATIENT
Start: 2025-03-19 | End: 2025-03-20 | Stop reason: HOSPADM

## 2025-03-19 RX ADMIN — METOPROLOL TARTRATE 50 MG: 50 TABLET, FILM COATED ORAL at 12:38

## 2025-03-19 RX ADMIN — IOPAMIDOL 100 ML: 755 INJECTION, SOLUTION INTRAVENOUS at 13:08

## 2025-03-19 NOTE — PROGRESS NOTES
PT HR 59-65 upon arrival to [facility] [place]. Other VS WNL. PT states that he took scheduled metoprolol last PM and today. Will medicate per protocol. PT is deaf but reads lips. PT has 20g to RAC placed by A, Cryelitzae, Yoono.

## 2025-03-19 NOTE — PROGRESS NOTES
I am asking: How competent can you be that he does not have severe left main proximal LAD or proximal right disease.  I asked because he has very atypical symptoms and he is deaf and so there is communication difficulties.  My clinical suspicion that he has any significant CAD is very low so the pretest probability would be very low.  In addition I note his calcium score of 0 which is reassuring but obviously not diagnostic.    I am just asking for some quick thoughts thanks

## 2025-03-19 NOTE — PROGRESS NOTES
"PT scan complete. PT states that he has a bit of a \"panic attack\" during scan and that he gets \"pretty claustrophobic\". States that he \"feels better now\". PT requests that results of CCTA be called to his emergency contact, Jesse and and further recommendations since he is deaf and cannot accept telephone calls. He said it would also be okay to text.  Will send Dr. Le a staff message so he is aware of patients request.   "

## 2025-03-21 NOTE — PROGRESS NOTES
Cassia -- Thank you.     Lesli Mullins, can you let him know his CTA was good. Reassuring. No severe coroanry disease.

## 2025-03-25 ENCOUNTER — TELEPHONE (OUTPATIENT)
Dept: CARDIAC SURGERY | Facility: CLINIC | Age: 60
End: 2025-03-25
Payer: MEDICARE

## 2025-06-27 RX ORDER — SPIRONOLACTONE 25 MG/1
25 TABLET ORAL DAILY
Qty: 90 TABLET | Refills: 3 | Status: SHIPPED | OUTPATIENT
Start: 2025-06-27

## (undated) DEVICE — Device

## (undated) DEVICE — Device: Brand: ISMUS

## (undated) DEVICE — Device: Brand: EZ STEER NAV DS

## (undated) DEVICE — Device: Brand: SMARTABLATE

## (undated) DEVICE — Device: Brand: WEBSTER CS

## (undated) DEVICE — Device: Brand: REFERENCE PATCH CARTO 3

## (undated) DEVICE — GW EMR FIX EXCHG J STD .035 3MM 260CM

## (undated) DEVICE — Device: Brand: LASSO NAV

## (undated) DEVICE — CATH DIAG IMPULSE FL4 5F 100CM

## (undated) DEVICE — PREF.GUIDING SHEATH W/MULT.CRV: Brand: PREFACE

## (undated) DEVICE — GLIDESHEATH SLENDER STAINLESS STEEL KIT: Brand: GLIDESHEATH SLENDER

## (undated) DEVICE — CLEARSIGHT FINGER CUFF LARGE MULTI PACK: Brand: CLEARSIGHT

## (undated) DEVICE — Device: Brand: SOUNDSTAR

## (undated) DEVICE — LOU EP: Brand: MEDLINE INDUSTRIES, INC.

## (undated) DEVICE — SYS TRNSEP ACC ACROSS ADLT BRK1 71CM

## (undated) DEVICE — PK CATH CARD 40

## (undated) DEVICE — PINNACLE INTRODUCER SHEATH: Brand: PINNACLE

## (undated) DEVICE — BLANKT WARM UNDER/BDY FUL/ACC A/ 90X206CM

## (undated) DEVICE — Device: Brand: THERMOCOOL SMARTTOUCH SF

## (undated) DEVICE — CATH VENT MIV RADL PIG ST TIP 5F 110CM

## (undated) DEVICE — SYS TRNSEP ACC BRK ACROSS A/ 71CM

## (undated) DEVICE — CATH DIAG IMPULSE FR4 5F 100CM

## (undated) DEVICE — INTRO STEER AGILIS NXT MED/CURL 8.5F